# Patient Record
Sex: FEMALE | Race: WHITE | NOT HISPANIC OR LATINO | URBAN - METROPOLITAN AREA
[De-identification: names, ages, dates, MRNs, and addresses within clinical notes are randomized per-mention and may not be internally consistent; named-entity substitution may affect disease eponyms.]

---

## 2019-01-21 ENCOUNTER — INPATIENT (INPATIENT)
Facility: HOSPITAL | Age: 85
LOS: 1 days | Discharge: HOME | DRG: 308 | End: 2019-01-23
Attending: STUDENT IN AN ORGANIZED HEALTH CARE EDUCATION/TRAINING PROGRAM | Admitting: INTERNAL MEDICINE
Payer: MEDICARE

## 2019-01-21 VITALS
OXYGEN SATURATION: 97 % | HEART RATE: 35 BPM | SYSTOLIC BLOOD PRESSURE: 108 MMHG | TEMPERATURE: 98 F | DIASTOLIC BLOOD PRESSURE: 70 MMHG | WEIGHT: 119.93 LBS | RESPIRATION RATE: 18 BRPM | HEIGHT: 63 IN

## 2019-01-21 DIAGNOSIS — R00.1 BRADYCARDIA, UNSPECIFIED: ICD-10-CM

## 2019-01-21 DIAGNOSIS — Z90.12 ACQUIRED ABSENCE OF LEFT BREAST AND NIPPLE: Chronic | ICD-10-CM

## 2019-01-21 DIAGNOSIS — Z98.890 OTHER SPECIFIED POSTPROCEDURAL STATES: Chronic | ICD-10-CM

## 2019-01-21 LAB
ALBUMIN SERPL ELPH-MCNC: 3 G/DL — LOW (ref 3.3–5)
ALP SERPL-CCNC: 195 U/L — HIGH (ref 40–120)
ALT FLD-CCNC: 420 U/L DA — HIGH (ref 10–45)
ANION GAP SERPL CALC-SCNC: 13 MMOL/L — SIGNIFICANT CHANGE UP (ref 5–17)
ANION GAP SERPL CALC-SCNC: 16 MMOL/L — SIGNIFICANT CHANGE UP (ref 5–17)
APTT BLD: 29 SEC — SIGNIFICANT CHANGE UP (ref 27.5–36.3)
AST SERPL-CCNC: 610 U/L — HIGH (ref 10–40)
BILIRUB SERPL-MCNC: 0.9 MG/DL — SIGNIFICANT CHANGE UP (ref 0.2–1.2)
BUN SERPL-MCNC: 46 MG/DL — HIGH (ref 7–23)
BUN SERPL-MCNC: 47 MG/DL — HIGH (ref 7–23)
CALCIUM SERPL-MCNC: 9.1 MG/DL — SIGNIFICANT CHANGE UP (ref 8.4–10.5)
CALCIUM SERPL-MCNC: 9.5 MG/DL — SIGNIFICANT CHANGE UP (ref 8.4–10.5)
CHLORIDE SERPL-SCNC: 104 MMOL/L — SIGNIFICANT CHANGE UP (ref 96–108)
CHLORIDE SERPL-SCNC: 106 MMOL/L — SIGNIFICANT CHANGE UP (ref 96–108)
CO2 SERPL-SCNC: 20 MMOL/L — LOW (ref 22–31)
CO2 SERPL-SCNC: 21 MMOL/L — LOW (ref 22–31)
CREAT SERPL-MCNC: 1.81 MG/DL — HIGH (ref 0.5–1.3)
CREAT SERPL-MCNC: 1.87 MG/DL — HIGH (ref 0.5–1.3)
GLUCOSE SERPL-MCNC: 106 MG/DL — HIGH (ref 70–99)
GLUCOSE SERPL-MCNC: 133 MG/DL — HIGH (ref 70–99)
HCT VFR BLD CALC: 38.8 % — SIGNIFICANT CHANGE UP (ref 34.5–45)
HGB BLD-MCNC: 12.6 G/DL — SIGNIFICANT CHANGE UP (ref 11.5–15.5)
INR BLD: 1.71 RATIO — HIGH (ref 0.88–1.16)
MAGNESIUM SERPL-MCNC: 2 MG/DL — SIGNIFICANT CHANGE UP (ref 1.6–2.6)
MCHC RBC-ENTMCNC: 30.9 PG — SIGNIFICANT CHANGE UP (ref 27–34)
MCHC RBC-ENTMCNC: 32.4 GM/DL — SIGNIFICANT CHANGE UP (ref 32–36)
MCV RBC AUTO: 95.3 FL — SIGNIFICANT CHANGE UP (ref 80–100)
NT-PROBNP SERPL-SCNC: 5529 PG/ML — HIGH (ref 0–300)
PLATELET # BLD AUTO: 268 K/UL — SIGNIFICANT CHANGE UP (ref 150–400)
POTASSIUM SERPL-MCNC: 4.9 MMOL/L — SIGNIFICANT CHANGE UP (ref 3.5–5.3)
POTASSIUM SERPL-MCNC: 5.6 MMOL/L — HIGH (ref 3.5–5.3)
POTASSIUM SERPL-SCNC: 4.9 MMOL/L — SIGNIFICANT CHANGE UP (ref 3.5–5.3)
POTASSIUM SERPL-SCNC: 5.6 MMOL/L — HIGH (ref 3.5–5.3)
PROT SERPL-MCNC: 6.6 G/DL — SIGNIFICANT CHANGE UP (ref 6–8.3)
PROTHROM AB SERPL-ACNC: 19.5 SEC — HIGH (ref 10–12.9)
RBC # BLD: 4.07 M/UL — SIGNIFICANT CHANGE UP (ref 3.8–5.2)
RBC # FLD: 12.4 % — SIGNIFICANT CHANGE UP (ref 10.3–14.5)
SODIUM SERPL-SCNC: 140 MMOL/L — SIGNIFICANT CHANGE UP (ref 135–145)
SODIUM SERPL-SCNC: 140 MMOL/L — SIGNIFICANT CHANGE UP (ref 135–145)
TROPONIN I SERPL-MCNC: 0.06 NG/ML — SIGNIFICANT CHANGE UP (ref 0.02–0.06)
TSH SERPL-MCNC: 4.86 UIU/ML — HIGH (ref 0.27–4.2)
WBC # BLD: 13 K/UL — HIGH (ref 3.8–10.5)
WBC # FLD AUTO: 13 K/UL — HIGH (ref 3.8–10.5)

## 2019-01-21 PROCEDURE — 93010 ELECTROCARDIOGRAM REPORT: CPT | Mod: 76

## 2019-01-21 PROCEDURE — 99222 1ST HOSP IP/OBS MODERATE 55: CPT

## 2019-01-21 PROCEDURE — 93010 ELECTROCARDIOGRAM REPORT: CPT | Mod: 77

## 2019-01-21 PROCEDURE — 99285 EMERGENCY DEPT VISIT HI MDM: CPT

## 2019-01-21 PROCEDURE — 71045 X-RAY EXAM CHEST 1 VIEW: CPT | Mod: 26

## 2019-01-21 PROCEDURE — 99223 1ST HOSP IP/OBS HIGH 75: CPT

## 2019-01-21 RX ORDER — ATROPINE SULFATE 0.1 MG/ML
0.5 SYRINGE (ML) INJECTION ONCE
Qty: 0 | Refills: 0 | Status: COMPLETED | OUTPATIENT
Start: 2019-01-21 | End: 2019-01-21

## 2019-01-21 RX ORDER — FUROSEMIDE 40 MG
20 TABLET ORAL ONCE
Qty: 0 | Refills: 0 | Status: COMPLETED | OUTPATIENT
Start: 2019-01-21 | End: 2019-01-21

## 2019-01-21 RX ORDER — CALCIUM GLUCONATE 100 MG/ML
1 VIAL (ML) INTRAVENOUS ONCE
Qty: 0 | Refills: 0 | Status: COMPLETED | OUTPATIENT
Start: 2019-01-21 | End: 2019-01-21

## 2019-01-21 RX ORDER — GLUCAGON INJECTION, SOLUTION 0.5 MG/.1ML
1 INJECTION, SOLUTION SUBCUTANEOUS ONCE
Qty: 0 | Refills: 0 | Status: COMPLETED | OUTPATIENT
Start: 2019-01-21 | End: 2019-01-21

## 2019-01-21 RX ORDER — ONDANSETRON 8 MG/1
4 TABLET, FILM COATED ORAL ONCE
Qty: 0 | Refills: 0 | Status: COMPLETED | OUTPATIENT
Start: 2019-01-21 | End: 2019-01-21

## 2019-01-21 RX ORDER — APIXABAN 2.5 MG/1
2.5 TABLET, FILM COATED ORAL EVERY 12 HOURS
Qty: 0 | Refills: 0 | Status: DISCONTINUED | OUTPATIENT
Start: 2019-01-21 | End: 2019-01-23

## 2019-01-21 RX ORDER — PANTOPRAZOLE SODIUM 20 MG/1
40 TABLET, DELAYED RELEASE ORAL
Qty: 0 | Refills: 0 | Status: DISCONTINUED | OUTPATIENT
Start: 2019-01-21 | End: 2019-01-23

## 2019-01-21 RX ADMIN — Medication 20 MILLIGRAM(S): at 05:53

## 2019-01-21 RX ADMIN — PANTOPRAZOLE SODIUM 40 MILLIGRAM(S): 20 TABLET, DELAYED RELEASE ORAL at 05:53

## 2019-01-21 RX ADMIN — APIXABAN 2.5 MILLIGRAM(S): 2.5 TABLET, FILM COATED ORAL at 17:46

## 2019-01-21 RX ADMIN — ONDANSETRON 4 MILLIGRAM(S): 8 TABLET, FILM COATED ORAL at 02:50

## 2019-01-21 RX ADMIN — APIXABAN 2.5 MILLIGRAM(S): 2.5 TABLET, FILM COATED ORAL at 05:53

## 2019-01-21 RX ADMIN — Medication 0.5 MILLIGRAM(S): at 02:45

## 2019-01-21 RX ADMIN — Medication 100 GRAM(S): at 05:53

## 2019-01-21 RX ADMIN — GLUCAGON INJECTION, SOLUTION 1 MILLIGRAM(S): 0.5 INJECTION, SOLUTION SUBCUTANEOUS at 03:50

## 2019-01-21 NOTE — H&P ADULT - ASSESSMENT
89 y/o F with HTN, GERD, HLD, Afib, now  very bradycardic. Appears to be in CHF, too, acute diastolic.  Hyperkalemia, Acute renal insuff, elev LFTs    Admit  Telemetry monitor  Hold Amiodarone, Atenolol, Cardizem  Will give a dose of Lasix now  Calcium Gluconate  Kayexalate  FFup Labs closely  Pt refuses pacemaker  Cardio Eval  Cont Eliquis  On Pantoprazole  Bladder scan  Wants to be DNR - forms filled out  Daughter by the bedside      IMPROVE VTE Individual Risk Assessment  RISK                                                                Points  [  ] Previous VTE                                                  3  [  ] Thrombophilia                                               2  [  ] Lower limb paralysis                                      2        (unable to hold up >15 seconds)    [  ] Current Cancer                                              2         (within 6 months)  [  ] Immobilization > 24 hrs                                1  [   ICU/CCU stay > 24 hours                              1  [x  ] Age > 60                                                      1  IMPROVE VTE Score ___1___  *Pt is on Eliquis

## 2019-01-21 NOTE — ED ADULT TRIAGE NOTE - CHIEF COMPLAINT QUOTE
C/O chest pressure since 8 PM last night with nausea. Pt states" that she was discharged from [facility] 2 days ago and started taking new medications"

## 2019-01-21 NOTE — ED ADULT NURSE NOTE - PMH
Atrial fibrillation, unspecified type    GERD (gastroesophageal reflux disease)    Hypercholesterolemia    Hypertension, unspecified type    Osteoporosis    TIA (transient ischemic attack)

## 2019-01-21 NOTE — H&P ADULT - HISTORY OF PRESENT ILLNESS
pt c/o chest pain, moderate, pressure, ant chest, no radiation, tonight since 9pm while in bed. assoc c difficult breathing, felt heart beating irregularly. got very dizzy like she was going to pass out when trying to get up to walk. no fever/chills. no cough. no headache. .assoc c nausea, sweats.  was admitted to [facility] for 4 days last week after syncopal episode and newly diagnosed rapid afib. was started on eliquis and cardizem. also on atenolol. HR was in 40s on discharge 89 y/o F with HTN on Atenolol, GERD, HLD, recently hospitalized at [facility] for new afib, was in RVR,  placed on Eliquis, Amio, Cardizem, just d/cherri home 3 days ago. Was okay until last night, experienced  chest pain/ pressure, non radiating assoc with mild dyspnea, also c/o dry cough.  When she tried to get up and walk, she felt her heart beating irregularly, felt lightheaded as if she was going to pass out.   Denies fever, chills, headache, nausea, abd pain.   Pt brought to the ED, found to have HR in the 30's.  Received 0.5 mg of Atropine IV, 1 mg Glucagon.  Labs reviewed, noted to have mild hyperkalemia, new renal insuff, elev LFTs.  Cxray with CHF.

## 2019-01-21 NOTE — CONSULT NOTE ADULT - ASSESSMENT
Imp  MARKED SYMPTOMATIC BRADYCARDIA IN PATIENT WITH RECURRENT AF WHO PRESENTED WITH SOME HYPERKALEMIA RENAL AND HEPATIC INSUFFICIENCY IN THE SETTING OF A RECENTLY NORMAL ECHO AND NUCLEAR TEST.    SUGGEST  REPEAT EKG TODAY AND TOMORROW  DC ALL NEGATIVE CHRONOTROPES FOR NOW  FOLLOW POTRASSIUM RENAL FUNCTION AND LIVER FUNCTION CAREFULLY  IF FERNANDO OCCURS, MAY NEED TO CHALLENGE WITH LOWER DOSE NEGATIVE CHRONOTROPES AND COULD EVEN REQUIRE PACING

## 2019-01-21 NOTE — CONSULT NOTE ADULT - SUBJECTIVE AND OBJECTIVE BOX
Chief Complaint:  generalized weakness and sob    HPI: 89 yr old woman with recwent vadmission to [facility] with amo put on atenolol and cardizem presents with a few days of weakness and sob. dian is a relatively poor historian. records from [facility] indicate that she had a normal nulcear perfusion test and normal echo. in er she was found to have ?af with very slow ventricualr response vs high degree av block. apparently improved with atropine. Currently feels much better. ambulated to bathroom with walkewr with no difficulty    PMH:   Hypercholesterolemia  GERD (gastroesophageal reflux disease)  Osteoporosis  TIA (transient ischemic attack)  Atrial fibrillation, unspecified type  Hypertension, unspecified type    PSH:   H/O Spinal surgery  H/O left mastectomy    Family History:  FAMILY HISTORY:  No pertinent family history in first degree relatives      Social History:  Smoking:no  Alcohol:no  Drugs:no    Allergies:  No Known Allergies      Medications:  apixaban 2.5 milliGRAM(s) Oral every 12 hours  pantoprazole    Tablet 40 milliGRAM(s) Oral before breakfast      REVIEW OF SYSTEMS:  CONSTITUTIONAL: No fever, weight loss, or fatigue  EYES: No eye pain, visual disturbances, or discharge  ENMT:  No difficulty hearing, tinnitus, vertigo; No sinus or throat pain  NECK: No pain or stiffness  BREASTS: No pain, masses, or nipple discharge  RESPIRATORY: No cough, wheezing, chills or hemoptysis; No shortness of breath  CARDIOVASCULAR: see hpi  GASTROINTESTINAL: No abdominal or epigastric pain. No nausea, vomiting, or hematemesis; No diarrhea or constipation. No melena or hematochezia.  GENITOURINARY: No dysuria, frequency, hematuria, or incontinence  NEUROLOGICAL: No headaches, memory loss, loss of strength, numbness, or tremors  SKIN: No itching, burning, rashes, or lesions   LYMPH NODES: No enlarged glands  ENDOCRINE: No heat or cold intolerance; No hair loss  MUSCULOSKELETAL: No joint pain or swelling; No muscle, back, or extremity pain  PSYCHIATRIC: No depression, anxiety, mood swings, or difficulty sleeping  HEME/LYMPH: No easy bruising, or bleeding gums  ALLERY AND IMMUNOLOGIC: No hives or eczema    Physical Exam:  T(C): 36.7 (01-21-19 @ 09:18), Max: 36.7 (01-21-19 @ 09:18)  HR: 67 (01-21-19 @ 09:18) (35 - 67)  BP: 149/62 (01-21-19 @ 09:18) (108/70 - 154/44)  RR: 16 (01-21-19 @ 09:18) (16 - 18)  SpO2: 93% (01-21-19 @ 09:18) (93% - 97%)  Wt(kg): --    GENERAL: NAD, well-groomed, well-developed  HEAD:  Atraumatic, Normocephalic  EYES: EOMI, conjunctiva and sclera clear  ENT: Moist mucous membranes,  NECK: Supple, No JVD, no bruits  CHEST/LUNG: Clear to percussion bilaterally; No rales, rhonchi, wheezing, or rubs  HEART: Regular rate and rhythm; No murmurs, rubs, or gallops PMI non displaced.  ABDOMEN: Soft, Nontender, Nondistended; Bowel sounds present  EXTREMITIES:  2+ Peripheral Pulses, No clubbing, cyanosis, or edema  SKIN: No rashes or lesions  NERVOUS SYSTEM:  Alert & Oriented X3, Good concentration; Motor Strength 5/5 B/L upper and lower extremities; DTRs 2+ intact and symmetric    Cardiovascular Diagnostic Testing:  ECG: af with slow response ivcd vs high degree av block    Labs:                        12.6   13.0  )-----------( 268      ( 21 Jan 2019 02:20 )             38.8     01-21    140  |  106  |  47<H>  ----------------------------<  106<H>  4.9   |  21<L>  |  1.81<H>    Ca    9.5      21 Jan 2019 08:50  Mg     2.0     01-21    TPro  6.6  /  Alb  3.0<L>  /  TBili  0.9  /  DBili  x   /  AST  610<H>  /  ALT  420<H>  /  AlkPhos  195<H>  01-21    PT/INR - ( 21 Jan 2019 02:20 )   PT: 19.5 sec;   INR: 1.71 ratio         PTT - ( 21 Jan 2019 02:20 )  PTT:29.0 sec  CARDIAC MARKERS ( 21 Jan 2019 02:20 )  .056 ng/mL / x     / x     / x     / x          Serum Pro-Brain Natriuretic Peptide: 5529 pg/mL (01-21 @ 02:20)            Imaging:cxr -slight fluid r base

## 2019-01-21 NOTE — ED PROVIDER NOTE - MEDICAL DECISION MAKING DETAILS
chest pain/near syncope c bradycardia. recently dx c afib and started on cardizem and atenolol,  symptomatic bradycardia, r/o acs, possible medication overdose/interaction. check labs, trop. currently stable.

## 2019-01-21 NOTE — ED PROVIDER NOTE - PMH
Atrial fibrillation, unspecified type    Hypertension, unspecified type    TIA (transient ischemic attack)

## 2019-01-21 NOTE — H&P ADULT - NSHPPHYSICALEXAM_GEN_ALL_CORE
Vital Signs (24 Hrs):  T(C): 36.5 (01-21-19 @ 01:59), Max: 36.5 (01-21-19 @ 01:59)  HR: 62 (01-21-19 @ 03:25) (35 - 62)  BP: 136/61 (01-21-19 @ 03:25) (108/70 - 137/56)  RR: 18 (01-21-19 @ 03:25) (18 - 18)  SpO2: 97% (01-21-19 @ 03:25) (96% - 97%)  Daily Height in cm: 160.02 (21 Jan 2019 01:59)

## 2019-01-21 NOTE — ED PROVIDER NOTE - OBJECTIVE STATEMENT
pt c/o chest pain, moderate, pressure, ant chest, no radiation, tonight since 9pm while in bed. assoc c difficult breathing, felt heart beating irregularly. got very dizzy like she was going to pass out when trying to get up to walk. no fever/chills. no cough. no headache. .assoc c nausea, sweats.  was admitted to [facility] for 4 days last week after syncopal episode and newly diagnosed rapid afib. was started on eliquis and cardizem. also on atenolol. HR was in 40s on discharge

## 2019-01-21 NOTE — ED PROVIDER NOTE - PROGRESS NOTE DETAILS
pt's HR 29-low 30s bpm. fully awake, alert, NAD, has some nausea still, mild dizziness. no chest pain. was given atropine 0.5mg.  after about 5 minutes, HR about 50. remained about 50bpm for about 10min, now reverted to low 30s.  nausea resolved since. pt feeling better. no chest pain. no sob spoke with eICU Dr Lala regarding pt symptomatic bradycardia. he recommended pt be transferred to [facility] or [facility] for definitive treatment as pt may need pacer I spoke with pt and HCP daughter regarding her symptomatic bradycardia and possible need for transfer for pacemaker/definitive treatment.  pt and daughter expressed that she has a DNR status and that pt does not want an invasive therapies, including permanent pacemaker or temporary transvenous pacing.  she understands that if condition worsens her blood pressure may fall, she may lose consciousness, her heart may stop and that she may die.  pt demonstrates clear understanding of this.  given pt's wishes, will admit pt to tele here. pt does not want to be transferred at this point. case d/w Dr Loaiza. accepting admission I spoke with pt and HCP daughter regarding her symptomatic bradycardia and possible need for transfer for pacemaker/definitive treatment.  pt and daughter expressed that she has a DNR status and that pt does not want any invasive therapies, including permanent pacemaker or temporary transvenous pacing.  she understands that if condition worsens her blood pressure may fall, she may lose consciousness, her heart may stop and that she may die.  pt demonstrates clear understanding of this.  given pt's wishes, will admit pt to tele here. pt does not want to be transferred at this point. case d/w Dr Loaiza. accepting admission

## 2019-01-21 NOTE — ED ADULT NURSE REASSESSMENT NOTE - NS ED NURSE REASSESS COMMENT FT1
Heart rate of 29 noted on cardiac monitor. Pacing pads placed on pt. Maintained pt on monitor. Dr. Jurado made aware. Atropine 0.5 mg IVP given as ordered.

## 2019-01-21 NOTE — ED ADULT NURSE NOTE - CHIEF COMPLAINT QUOTE
C/O chest pressure since 8 PM last night with nausea. Pt states" that she was discharged from [facility] 2 days ago and started taking new medications"

## 2019-01-22 DIAGNOSIS — I48.91 UNSPECIFIED ATRIAL FIBRILLATION: ICD-10-CM

## 2019-01-22 DIAGNOSIS — K21.9 GASTRO-ESOPHAGEAL REFLUX DISEASE WITHOUT ESOPHAGITIS: ICD-10-CM

## 2019-01-22 DIAGNOSIS — I10 ESSENTIAL (PRIMARY) HYPERTENSION: ICD-10-CM

## 2019-01-22 DIAGNOSIS — R00.1 BRADYCARDIA, UNSPECIFIED: ICD-10-CM

## 2019-01-22 DIAGNOSIS — E78.00 PURE HYPERCHOLESTEROLEMIA, UNSPECIFIED: ICD-10-CM

## 2019-01-22 LAB
ALBUMIN SERPL ELPH-MCNC: 2.8 G/DL — LOW (ref 3.3–5)
ALP SERPL-CCNC: 154 U/L — HIGH (ref 40–120)
ALT FLD-CCNC: 322 U/L DA — HIGH (ref 10–45)
ANION GAP SERPL CALC-SCNC: 11 MMOL/L — SIGNIFICANT CHANGE UP (ref 5–17)
AST SERPL-CCNC: 271 U/L — HIGH (ref 10–40)
BILIRUB DIRECT SERPL-MCNC: 0.2 MG/DL — SIGNIFICANT CHANGE UP (ref 0–0.2)
BILIRUB INDIRECT FLD-MCNC: 0.4 MG/DL — SIGNIFICANT CHANGE UP (ref 0.2–1)
BILIRUB SERPL-MCNC: 0.6 MG/DL — SIGNIFICANT CHANGE UP (ref 0.2–1.2)
BUN SERPL-MCNC: 48 MG/DL — HIGH (ref 7–23)
CALCIUM SERPL-MCNC: 9.1 MG/DL — SIGNIFICANT CHANGE UP (ref 8.4–10.5)
CHLORIDE SERPL-SCNC: 106 MMOL/L — SIGNIFICANT CHANGE UP (ref 96–108)
CO2 SERPL-SCNC: 25 MMOL/L — SIGNIFICANT CHANGE UP (ref 22–31)
CREAT SERPL-MCNC: 1.63 MG/DL — HIGH (ref 0.5–1.3)
GLUCOSE SERPL-MCNC: 86 MG/DL — SIGNIFICANT CHANGE UP (ref 70–99)
POTASSIUM SERPL-MCNC: 4.3 MMOL/L — SIGNIFICANT CHANGE UP (ref 3.5–5.3)
POTASSIUM SERPL-SCNC: 4.3 MMOL/L — SIGNIFICANT CHANGE UP (ref 3.5–5.3)
PROT SERPL-MCNC: 6.1 G/DL — SIGNIFICANT CHANGE UP (ref 6–8.3)
SODIUM SERPL-SCNC: 142 MMOL/L — SIGNIFICANT CHANGE UP (ref 135–145)

## 2019-01-22 PROCEDURE — 99233 SBSQ HOSP IP/OBS HIGH 50: CPT

## 2019-01-22 PROCEDURE — 93010 ELECTROCARDIOGRAM REPORT: CPT

## 2019-01-22 RX ORDER — ATORVASTATIN CALCIUM 80 MG/1
1 TABLET, FILM COATED ORAL
Qty: 0 | Refills: 0 | COMMUNITY

## 2019-01-22 RX ORDER — CHOLECALCIFEROL (VITAMIN D3) 125 MCG
1 CAPSULE ORAL
Qty: 0 | Refills: 0 | COMMUNITY

## 2019-01-22 RX ORDER — PANTOPRAZOLE SODIUM 20 MG/1
1 TABLET, DELAYED RELEASE ORAL
Qty: 0 | Refills: 0 | COMMUNITY

## 2019-01-22 RX ADMIN — APIXABAN 2.5 MILLIGRAM(S): 2.5 TABLET, FILM COATED ORAL at 05:24

## 2019-01-22 RX ADMIN — PANTOPRAZOLE SODIUM 40 MILLIGRAM(S): 20 TABLET, DELAYED RELEASE ORAL at 05:24

## 2019-01-22 RX ADMIN — APIXABAN 2.5 MILLIGRAM(S): 2.5 TABLET, FILM COATED ORAL at 16:55

## 2019-01-22 NOTE — PROGRESS NOTE ADULT - ATTENDING COMMENTS
sick sinus syndrome  tachy carrie. patine now sinus off AV colt blockade, but clearly at risk for recurrent tachycardia. refusing pacing. will discuss with dr day [phone]

## 2019-01-22 NOTE — PROGRESS NOTE ADULT - PROBLEM SELECTOR PLAN 1
Bradycardia resulted in liver and kidney abnormal function that is improving   HR has improved with the dc of cardizem and BB  Cards rec of pacer is being considered by family and patietn

## 2019-01-22 NOTE — PROGRESS NOTE ADULT - SUBJECTIVE AND OBJECTIVE BOX
Follow up for  SUBJ:    no cardiac complaitns appears younger than stated age    PMH  Hypercholesterolemia  GERD (gastroesophageal reflux disease)  Osteoporosis  TIA (transient ischemic attack)  Atrial fibrillation, unspecified type  Hypertension, unspecified type      MEDICATIONS  (STANDING):  apixaban 2.5 milliGRAM(s) Oral every 12 hours  pantoprazole    Tablet 40 milliGRAM(s) Oral before breakfast    MEDICATIONS  (PRN):        PHYSICAL EXAM:  Vital Signs Last 24 Hrs  T(C): 36.6 (22 Jan 2019 13:14), Max: 36.6 (22 Jan 2019 13:14)  T(F): 97.9 (22 Jan 2019 13:14), Max: 97.9 (22 Jan 2019 13:14)  HR: 60 (22 Jan 2019 13:14) (54 - 66)  BP: 150/70 (22 Jan 2019 13:14) (147/55 - 156/63)  BP(mean): --  RR: 16 (22 Jan 2019 13:14) (15 - 16)  SpO2: 95% (22 Jan 2019 13:14) (95% - 97%)    GENERAL: NAD, well-groomed, well-developed  HEAD:  Atraumatic, Normocephalic  EYES: EOMI, PERRLA, conjunctiva and sclera clear  ENT: Moist mucous membranes,  NECK: Supple, No JVD, no bruits  CHEST/LUNG: Clear to percussion bilaterally; No rales, rhonchi, wheezing, or rubs  HEART: Regular rate and rhythm; No murmurs, rubs, or gallops PMI non displaced.  ABDOMEN: Soft, Nontender, Nondistended; Bowel sounds present  EXTREMITIES:  2+ Peripheral Pulses, No clubbing, cyanosis, or edema  SKIN: No rashes or lesions  NERVOUS SYSTEM:  Cranial Nerves II-XII intact      TELEMETRY:    sb    ECG:    < from: 12 Lead ECG (01.22.19 @ 04:57) >  Ventricular Rate 56 BPM    Atrial Rate 56 BPM    P-R Interval 188 ms    QRS Duration 136 ms    Q-T Interval 524 ms    QTC Calculation(Bezet) 505 ms    P Axis 36 degrees    R Axis -39 degrees    T Axis 118 degrees    Diagnosis Line Sinus bradycardiawith sinus arrhythmia  Left axis deviation  Left bundle branch block    Abnormal ECG  When compared with ECG of 21-JAN-2019 18:24,  first degree av block resolved  Confirmed by WILMAR HERNANDEZ MD ([id number]) on 1/22/2019 8:25:17 AM    < end of copied text >    ECHO:    LABS:                        12.6   13.0  )-----------( 268      ( 21 Jan 2019 02:20 )             38.8     01-22    142  |  106  |  48<H>  ----------------------------<  86  4.3   |  25  |  1.63<H>    Ca    9.1      22 Jan 2019 06:25  Mg     2.0     01-21    TPro  6.1  /  Alb  2.8<L>  /  TBili  0.6  /  DBili  0.2  /  AST  271<H>  /  ALT  322<H>  /  AlkPhos  154<H>  01-22    CARDIAC MARKERS ( 21 Jan 2019 02:20 )  .056 ng/mL / x     / x     / x     / x          PT/INR - ( 21 Jan 2019 02:20 )   PT: 19.5 sec;   INR: 1.71 ratio         PTT - ( 21 Jan 2019 02:20 )  PTT:29.0 sec    I&O's Summary    21 Jan 2019 07:01  -  22 Jan 2019 07:00  --------------------------------------------------------  IN: 400 mL / OUT: 0 mL / NET: 400 mL      BNP    RADIOLOGY & ADDITIONAL STUDIES:    ECHO:

## 2019-01-22 NOTE — PROGRESS NOTE ADULT - SUBJECTIVE AND OBJECTIVE BOX
Follow up for tachy carrie syndrome      SUBJ:    appears comfortable    PMH  Hypercholesterolemia  GERD (gastroesophageal reflux disease)  Osteoporosis  TIA (transient ischemic attack)  Atrial fibrillation, unspecified type  Hypertension, unspecified type      MEDICATIONS  (STANDING):  apixaban 2.5 milliGRAM(s) Oral every 12 hours  pantoprazole    Tablet 40 milliGRAM(s) Oral before breakfast    MEDICATIONS  (PRN):        PHYSICAL EXAM:  Vital Signs Last 24 Hrs  T(C): 36.6 (22 Jan 2019 13:14), Max: 36.6 (22 Jan 2019 13:14)  T(F): 97.9 (22 Jan 2019 13:14), Max: 97.9 (22 Jan 2019 13:14)  HR: 60 (22 Jan 2019 13:14) (54 - 61)  BP: 150/70 (22 Jan 2019 13:14) (147/55 - 156/63)  BP(mean): --  RR: 16 (22 Jan 2019 13:14) (15 - 16)  SpO2: 95% (22 Jan 2019 13:14) (95% - 97%)    GENERAL: NAD, well-groomed, well-developed appears younger than stated age  HEAD:  Atraumatic, Normocephalic  EYES: EOMI, PERRLA, conjunctiva and sclera clear  ENT: Moist mucous membranes,  NECK: Supple, No JVD, no bruits  CHEST/LUNG: Clear to percussion bilaterally; No rales, rhonchi, wheezing, or rubs  HEART: Regular rate and rhythm; No murmurs, rubs, or gallops PMI non displaced.  ABDOMEN: Soft, Nontender, Nondistended; Bowel sounds present  EXTREMITIES:  2+ Peripheral Pulses, No clubbing, cyanosis, or edema  SKIN: No rashes or lesions  NERVOUS SYSTEM:  Cranial Nerves II-XII intact      TELEMETRY:    sb    ECG:    < from: 12 Lead ECG (01.22.19 @ 04:57) >  Ventricular Rate 56 BPM    Atrial Rate 56 BPM    P-R Interval 188 ms    QRS Duration 136 ms    Q-T Interval 524 ms    QTC Calculation(Bezet) 505 ms    P Axis 36 degrees    R Axis -39 degrees    T Axis 118 degrees    Diagnosis Line Sinus bradycardiawith sinus arrhythmia  Left axis deviation  Left bundle branch block    Abnormal ECG  When compared with ECG of 21-JAN-2019 18:24,  first degree av block resolved  Confirmed by WILMAR HERNANDEZ MD ([id number]) on 1/22/2019 8:25:17 AM    < end of copied text >    ECHO:      LABS:                        12.6   13.0  )-----------( 268      ( 21 Jan 2019 02:20 )             38.8     01-22    142  |  106  |  48<H>  ----------------------------<  86  4.3   |  25  |  1.63<H>    Ca    9.1      22 Jan 2019 06:25  Mg     2.0     01-21    TPro  6.1  /  Alb  2.8<L>  /  TBili  0.6  /  DBili  0.2  /  AST  271<H>  /  ALT  322<H>  /  AlkPhos  154<H>  01-22    CARDIAC MARKERS ( 21 Jan 2019 02:20 )  .056 ng/mL / x     / x     / x     / x          PT/INR - ( 21 Jan 2019 02:20 )   PT: 19.5 sec;   INR: 1.71 ratio         PTT - ( 21 Jan 2019 02:20 )  PTT:29.0 sec    I&O's Summary    21 Jan 2019 07:01  -  22 Jan 2019 07:00  --------------------------------------------------------  IN: 400 mL / OUT: 0 mL / NET: 400 mL      BNP    RADIOLOGY & ADDITIONAL STUDIES:    ECHO:

## 2019-01-22 NOTE — PROGRESS NOTE ADULT - ATTENDING COMMENTS
tachy carrie syndrome slow Afib now rsr  discussed consideration to pacer which is a class II indication for tachy carrie syndrome. patient is currently against this option. would attempt low dose beta blocker e.g metoprolol tartrate 25 mg bid. with parameters.  outpatient halter and follow up with dr saxena. discussed with dr madsen by phone. opd follow of heart rate discussed with family. would also consider a NOAC such as Eliquis in low dose 2.5 bid  given risk factors age and elevated creat h/o TIAS and elevated CHADSVASC2 risk. patient and dr madsen in favor. call placed to son  jillian

## 2019-01-22 NOTE — PROGRESS NOTE ADULT - ASSESSMENT
89 y/o F with HTN, GERD, HLD, Afib, now  very bradycardic. Appears to be in CHF, too, acute diastolic.  Hyperkalemia, Acute renal insuff, elev LFTs    Admit  Telemetry monitor  Hold Amiodarone, Atenolol, Cardizem  Will give a dose of Lasix now  Calcium Gluconate  Kayexalate  FFup Labs closely  Pt refuses pacemaker  Cardio Eval  Cont Eliquis  On Pantoprazole  Bladder scan  Wants to be DNR - forms filled out  Daughter by the bedside      IMPROVE VTE Individual Risk Assessment  RISK                                                                Points  [  ] Previous VTE                                                  3  [  ] Thrombophilia                                               2  [  ] Lower limb paralysis                                      2        (unable to hold up >15 seconds)    [  ] Current Cancer                                              2         (within 6 months)  [  ] Immobilization > 24 hrs                                1  [   ICU/CCU stay > 24 hours                              1  [x  ] Age > 60                                                      1  IMPROVE VTE Score ___1___  *Pt is on Eliquis

## 2019-01-23 VITALS
RESPIRATION RATE: 15 BRPM | SYSTOLIC BLOOD PRESSURE: 166 MMHG | HEART RATE: 56 BPM | DIASTOLIC BLOOD PRESSURE: 54 MMHG | OXYGEN SATURATION: 94 % | TEMPERATURE: 98 F

## 2019-01-23 LAB
ALBUMIN SERPL ELPH-MCNC: 2.6 G/DL — LOW (ref 3.3–5)
ALP SERPL-CCNC: 132 U/L — HIGH (ref 40–120)
ALT FLD-CCNC: 230 U/L DA — HIGH (ref 10–45)
ANION GAP SERPL CALC-SCNC: 10 MMOL/L — SIGNIFICANT CHANGE UP (ref 5–17)
AST SERPL-CCNC: 128 U/L — HIGH (ref 10–40)
BILIRUB DIRECT SERPL-MCNC: 0.1 MG/DL — SIGNIFICANT CHANGE UP (ref 0–0.2)
BILIRUB INDIRECT FLD-MCNC: 0.4 MG/DL — SIGNIFICANT CHANGE UP (ref 0.2–1)
BILIRUB SERPL-MCNC: 0.5 MG/DL — SIGNIFICANT CHANGE UP (ref 0.2–1.2)
BUN SERPL-MCNC: 42 MG/DL — HIGH (ref 7–23)
CALCIUM SERPL-MCNC: 8.6 MG/DL — SIGNIFICANT CHANGE UP (ref 8.4–10.5)
CHLORIDE SERPL-SCNC: 107 MMOL/L — SIGNIFICANT CHANGE UP (ref 96–108)
CHOLEST SERPL-MCNC: 106 MG/DL — SIGNIFICANT CHANGE UP (ref 10–199)
CO2 SERPL-SCNC: 24 MMOL/L — SIGNIFICANT CHANGE UP (ref 22–31)
CREAT SERPL-MCNC: 1.47 MG/DL — HIGH (ref 0.5–1.3)
GLUCOSE SERPL-MCNC: 101 MG/DL — HIGH (ref 70–99)
HCT VFR BLD CALC: 34.7 % — SIGNIFICANT CHANGE UP (ref 34.5–45)
HDLC SERPL-MCNC: 34 MG/DL — LOW
HGB BLD-MCNC: 11.3 G/DL — LOW (ref 11.5–15.5)
LIPID PNL WITH DIRECT LDL SERPL: 51 MG/DL — SIGNIFICANT CHANGE UP
MCHC RBC-ENTMCNC: 30.6 PG — SIGNIFICANT CHANGE UP (ref 27–34)
MCHC RBC-ENTMCNC: 32.5 GM/DL — SIGNIFICANT CHANGE UP (ref 32–36)
MCV RBC AUTO: 94 FL — SIGNIFICANT CHANGE UP (ref 80–100)
PLATELET # BLD AUTO: 228 K/UL — SIGNIFICANT CHANGE UP (ref 150–400)
POTASSIUM SERPL-MCNC: 4.1 MMOL/L — SIGNIFICANT CHANGE UP (ref 3.5–5.3)
POTASSIUM SERPL-SCNC: 4.1 MMOL/L — SIGNIFICANT CHANGE UP (ref 3.5–5.3)
PROT SERPL-MCNC: 5.9 G/DL — LOW (ref 6–8.3)
RBC # BLD: 3.69 M/UL — LOW (ref 3.8–5.2)
RBC # FLD: 12.4 % — SIGNIFICANT CHANGE UP (ref 10.3–14.5)
SODIUM SERPL-SCNC: 141 MMOL/L — SIGNIFICANT CHANGE UP (ref 135–145)
TOTAL CHOLESTEROL/HDL RATIO MEASUREMENT: 3.1 RATIO — LOW (ref 3.3–7.1)
TRIGL SERPL-MCNC: 104 MG/DL — SIGNIFICANT CHANGE UP (ref 10–149)
WBC # BLD: 9 K/UL — SIGNIFICANT CHANGE UP (ref 3.8–10.5)
WBC # FLD AUTO: 9 K/UL — SIGNIFICANT CHANGE UP (ref 3.8–10.5)

## 2019-01-23 PROCEDURE — 99233 SBSQ HOSP IP/OBS HIGH 50: CPT

## 2019-01-23 PROCEDURE — 80061 LIPID PANEL: CPT

## 2019-01-23 PROCEDURE — 71045 X-RAY EXAM CHEST 1 VIEW: CPT

## 2019-01-23 PROCEDURE — 85027 COMPLETE CBC AUTOMATED: CPT

## 2019-01-23 PROCEDURE — 84484 ASSAY OF TROPONIN QUANT: CPT

## 2019-01-23 PROCEDURE — 93005 ELECTROCARDIOGRAM TRACING: CPT

## 2019-01-23 PROCEDURE — 99285 EMERGENCY DEPT VISIT HI MDM: CPT | Mod: 25

## 2019-01-23 PROCEDURE — 83735 ASSAY OF MAGNESIUM: CPT

## 2019-01-23 PROCEDURE — 84443 ASSAY THYROID STIM HORMONE: CPT

## 2019-01-23 PROCEDURE — 85610 PROTHROMBIN TIME: CPT

## 2019-01-23 PROCEDURE — 80048 BASIC METABOLIC PNL TOTAL CA: CPT

## 2019-01-23 PROCEDURE — 80076 HEPATIC FUNCTION PANEL: CPT

## 2019-01-23 PROCEDURE — 85730 THROMBOPLASTIN TIME PARTIAL: CPT

## 2019-01-23 PROCEDURE — 83880 ASSAY OF NATRIURETIC PEPTIDE: CPT

## 2019-01-23 PROCEDURE — 96374 THER/PROPH/DIAG INJ IV PUSH: CPT

## 2019-01-23 PROCEDURE — 99239 HOSP IP/OBS DSCHRG MGMT >30: CPT

## 2019-01-23 PROCEDURE — 96375 TX/PRO/DX INJ NEW DRUG ADDON: CPT

## 2019-01-23 PROCEDURE — 80053 COMPREHEN METABOLIC PANEL: CPT

## 2019-01-23 RX ORDER — ATENOLOL 25 MG/1
0 TABLET ORAL
Qty: 0 | Refills: 0 | COMMUNITY

## 2019-01-23 RX ORDER — DILTIAZEM HCL 120 MG
0 CAPSULE, EXT RELEASE 24 HR ORAL
Qty: 0 | Refills: 0 | COMMUNITY

## 2019-01-23 RX ORDER — APIXABAN 2.5 MG/1
1 TABLET, FILM COATED ORAL
Qty: 0 | Refills: 0 | DISCHARGE
Start: 2019-01-23

## 2019-01-23 RX ORDER — METOPROLOL TARTRATE 50 MG
1 TABLET ORAL
Qty: 30 | Refills: 0 | OUTPATIENT
Start: 2019-01-23 | End: 2019-02-06

## 2019-01-23 RX ORDER — METOPROLOL TARTRATE 50 MG
25 TABLET ORAL EVERY 12 HOURS
Qty: 0 | Refills: 0 | Status: DISCONTINUED | OUTPATIENT
Start: 2019-01-23 | End: 2019-01-23

## 2019-01-23 RX ORDER — AMIODARONE HYDROCHLORIDE 400 MG/1
1 TABLET ORAL
Qty: 0 | Refills: 0 | COMMUNITY

## 2019-01-23 RX ORDER — APIXABAN 2.5 MG/1
1 TABLET, FILM COATED ORAL
Qty: 0 | Refills: 0 | COMMUNITY

## 2019-01-23 RX ADMIN — APIXABAN 2.5 MILLIGRAM(S): 2.5 TABLET, FILM COATED ORAL at 05:09

## 2019-01-23 RX ADMIN — PANTOPRAZOLE SODIUM 40 MILLIGRAM(S): 20 TABLET, DELAYED RELEASE ORAL at 05:09

## 2019-01-23 RX ADMIN — Medication 25 MILLIGRAM(S): at 10:06

## 2019-01-23 NOTE — PROGRESS NOTE ADULT - ASSESSMENT
89 year old woman admitted with near syncope.  She has had PAF, symptomatic bradycardia.... currently in SR.  Anticoagulated    Plan  1. Continue low dose beta blocker... no significant tachycardia or bradycardia has been noted.  2. I discussed with patient and daughter re PM.... she will consider this option.  She would like to followup with her physician at [facility] before deciding to proceed.  3. Continue anticoagulation  4. Advance activity     discussed with patient and Medicine team

## 2019-01-23 NOTE — DISCHARGE NOTE ADULT - HOSPITAL COURSE
Patient came in bradycardia heart rate in the 30s and was symptomatic with severe lack of energy.  Patient responded to atropine.  Patient showed shock related kidney and liver function test afterwards.  Throughout her hospital stay these improved. Her diltiazem and atenolol was stopped and her rate stayed in the 60s and 50s.  She was seen by cardiology who recommended that she start on metoprolol 25mg bid.  She may need a pacemaker in the future. Patient came in bradycardia heart rate in the 30s and was symptomatic with severe lack of energy.  Patient responded to atropine.  Patient showed shock related kidney and liver function test afterwards.  Throughout her hospital stay these improved. Her diltiazem and atenolol was stopped and her rate stayed in the 60s and 50s.  She was seen by cardiology who recommended that she start on metoprolol 25mg bid.  She may need a pacemaker in the future.    PCP Dr. Ortez was not able to be reached by phone.  A fax letter with attending phone number was sent to her office.

## 2019-01-23 NOTE — DISCHARGE NOTE ADULT - PLAN OF CARE
Adequate rate Came with bradycardia - diltizem was stop and atenolol  Leaving with low dose BB, rate is adequate

## 2019-01-23 NOTE — DISCHARGE NOTE ADULT - MEDICATION SUMMARY - MEDICATIONS TO TAKE
I will START or STAY ON the medications listed below when I get home from the hospital:    apixaban 2.5 mg oral tablet  -- 1 tab(s) by mouth every 12 hours  -- Indication: For Anticoagulant    atorvastatin 40 mg oral tablet  -- 1 tab(s) by mouth once a day  -- Indication: For cholesterol    metoprolol tartrate 25 mg oral tablet  -- 1 tab(s) by mouth every 12 hours  -- Indication: For Blood pressure    pantoprazole 40 mg oral delayed release tablet  -- 1 tab(s) by mouth once a day  -- Indication: For GERD (gastroesophageal reflux disease)    Vitamin D3 1000 intl units oral tablet  -- 1 tab(s) by mouth once a day  -- Indication: For Vitamin

## 2019-01-23 NOTE — DIETITIAN INITIAL EVALUATION ADULT. - SOURCE
89 y/o F with HTN on Atenolol, GERD, HLD, recently hospitalized at [facility] for new afib, was in RVR,  placed on Eliquis, Amio, Cardizem, just d/cherri home 3 days ago. Was okay until last night, experienced  chest pain/ pressure, non radiating assoc with mild dyspnea, also c/o dry cough.  When she tried to get up and walk, she felt her heart beating irregularly, felt lightheaded as if she was going to pass out. ?? PPM as per patient/patient

## 2019-01-23 NOTE — PROGRESS NOTE ADULT - SUBJECTIVE AND OBJECTIVE BOX
Patient is a 89y old  Female who presents with a chief complaint of chest pressure, dizziness, near syncope (23 Jan 2019 11:24)      Patient seen and examined at bedside.  Patient denies any chest pain or shortness breath.    ALLERGIES:  No Known Allergies    MEDICATIONS:  apixaban 2.5 milliGRAM(s) Oral every 12 hours  metoprolol tartrate 25 milliGRAM(s) Oral every 12 hours  pantoprazole    Tablet 40 milliGRAM(s) Oral before breakfast    Vital Signs Last 24 Hrs  T(F): 97.6 (23 Jan 2019 07:47), Max: 98.7 (22 Jan 2019 16:40)  HR: 57 (23 Jan 2019 07:47) (57 - 60)  BP: 171/77 (23 Jan 2019 07:47) (150/70 - 173/67)  RR: 15 (23 Jan 2019 07:47) (15 - 16)  SpO2: 94% (23 Jan 2019 07:47) (94% - 96%)  I&O's Summary    22 Jan 2019 07:01  -  23 Jan 2019 07:00  --------------------------------------------------------  IN: 200 mL / OUT: 0 mL / NET: 200 mL        PHYSICAL EXAM:  General: NAD, A/O x 3  ENT: MMM  Neck: Supple, No JVD  Lungs: Clear to auscultation bilaterally  Cardio: RRR, S1/S2, No murmurs  Abdomen: Soft, Nontender, Nondistended; Bowel sounds present  Extremities: No cyanosis, No edema    LABS:                        11.3   9.0   )-----------( 228      ( 23 Jan 2019 05:50 )             34.7     01-23    141  |  107  |  42  ----------------------------<  101  4.1   |  24  |  1.47    Ca    8.6      23 Jan 2019 05:50  Mg     2.0     01-21    TPro  5.9  /  Alb  2.6  /  TBili  0.5  /  DBili  0.1  /  AST  128  /  ALT  230  /  AlkPhos  132  01-23    eGFR if Non African American: 30 mL/min/1.73M2 (01-23-19 @ 05:50)  eGFR if African American: 35 mL/min/1.73M2 (01-23-19 @ 05:50)    PT/INR - ( 21 Jan 2019 02:20 )   PT: 19.5 sec;   INR: 1.71 ratio         PTT - ( 21 Jan 2019 02:20 )  PTT:29.0 sec    CARDIAC MARKERS ( 21 Jan 2019 02:20 )  .056 ng/mL / x     / x     / x     / x          01-23 Chol 106 mg/dL LDL 51 mg/dL HDL 34 mg/dL Trig 104 mg/dL  TSH 4.86   TSH with FT4 reflex --  Total T3 --        CAPILLARY BLOOD GLUCOSE                RADIOLOGY & ADDITIONAL TESTS:    Care Discussed with Consultants/Other Providers:  Dr. Diaz

## 2019-01-23 NOTE — DISCHARGE NOTE ADULT - CARE PLAN
Principal Discharge DX:	Atrial fibrillation, unspecified type  Goal:	Adequate rate  Assessment and plan of treatment:	Came with bradycardia - diltizem was stop and atenolol  Leaving with low dose BB, rate is adequate

## 2019-01-23 NOTE — DISCHARGE NOTE ADULT - MEDICATION SUMMARY - MEDICATIONS TO STOP TAKING
I will STOP taking the medications listed below when I get home from the hospital:    amiodarone 200 mg oral tablet  -- 1 tab(s) by mouth once a day    atenolol 50 mg oral tablet  -- orally 2 times a day    dilTIAZem 120 mg oral tablet  -- orally once a day

## 2019-01-23 NOTE — DISCHARGE NOTE ADULT - PATIENT PORTAL LINK FT
You can access the [facility], offered by [facility], by registering with the following website: [email or web address]

## 2019-01-23 NOTE — PROGRESS NOTE ADULT - PROBLEM SELECTOR PLAN 1
Bradycardia resulted in liver and kidney abnormal function that is improving   HR has improved with the dc of cardizem, metoporol was restarted 1/22  Patient may need a pacer in the future

## 2019-01-23 NOTE — PROGRESS NOTE ADULT - SUBJECTIVE AND OBJECTIVE BOX
SUBJ:  Patient is a 89y old  Female who presents with a chief complaint of chest pressure, dizziness, near syncope (22 Jan 2019 18:07)  patient examined and chart reviewed   she feels good "back to normal"    PAST MEDICAL & SURGICAL HISTORY:  Hypercholesterolemia  GERD (gastroesophageal reflux disease)  Osteoporosis  TIA (transient ischemic attack)  Atrial fibrillation, unspecified type  Hypertension, unspecified type  H/O Spinal surgery: in 1976  H/O left mastectomy: in 2003      MEDICATIONS  (STANDING):  apixaban 2.5 milliGRAM(s) Oral every 12 hours  metoprolol tartrate 25 milliGRAM(s) Oral every 12 hours  pantoprazole    Tablet 40 milliGRAM(s) Oral before breakfast    MEDICATIONS  (PRN):          Vital Signs Last 24 Hrs  T(C): 36.4 (23 Jan 2019 07:47), Max: 37.1 (22 Jan 2019 16:40)  T(F): 97.6 (23 Jan 2019 07:47), Max: 98.7 (22 Jan 2019 16:40)  HR: 57 (23 Jan 2019 07:47) (57 - 60)  BP: 171/77 (23 Jan 2019 07:47) (150/70 - 173/67)  BP(mean): --  RR: 15 (23 Jan 2019 07:47) (15 - 16)  SpO2: 94% (23 Jan 2019 07:47) (94% - 96%)    REVIEW OF SYSTEMS:  CONSTITUTIONAL: No fever, weight loss, or fatigue  RESPIRATORY: No cough, wheezing, chills or hemoptysis; No shortness of breath  CARDIOVASCULAR: No chest pain or chest pressure.  No shortness of breath or dyspnea on exertion.  No palpitations, dizziness, light headedness, syncope or near syncope.  No edema, no orthopnea.   NEUROLOGICAL: No headaches, memory loss, loss of strength, numbness, or tremors      PHYSICAL EXAM  Constitutional:  WDWN. No acute distress  HEENT: normocephalic, atraumatic.  PERRLA. EOMI  Neck : No JVD. no carotid bruits  Lungs:  clear to auscultation bilaterally. no rhonchi. no wheezing  Heart:  S1 and S2. No S3, S4. II/VI systolic murmur.  Abdomen:  soft, non tender.  Extremities: No clubbing, cyanoisis or edema  Nuerologic:  A+O x 3. No focal deficits  Skin:  no rashes    LABS:                        11.3   9.0   )-----------( 228      ( 23 Jan 2019 05:50 )             34.7     01-23    141  |  107  |  42<H>  ----------------------------<  101<H>  4.1   |  24  |  1.47<H>    Ca    8.6      23 Jan 2019 05:50    TPro  5.9<L>  /  Alb  2.6<L>  /  TBili  0.5  /  DBili  0.1  /  AST  128<H>  /  ALT  230<H>  /  AlkPhos  132<H>  01-23            apixaban 2.5 milliGRAM(s) Oral every 12 hours  metoprolol tartrate 25 milliGRAM(s) Oral every 12 hours  pantoprazole    Tablet 40 milliGRAM(s) Oral before breakfast    I&O's Summary    22 Jan 2019 07:01  -  23 Jan 2019 07:00  --------------------------------------------------------  IN: 200 mL / OUT: 0 mL / NET: 200 mL    < from: 12 Lead ECG (01.22.19 @ 04:57) >   Sinus bradycardiawith sinus arrhythmia  Left axis deviation  Left bundle branch block    Abnormal ECG  When compared with ECG of 21-JAN-2019 18:24,  first degree av block resolved    < end of copied text >

## 2019-01-23 NOTE — PROGRESS NOTE ADULT - ASSESSMENT
89 y/o F with HTN, GERD, HLD, Afib, now  very bradycardic. Appears to be in CHF, too, acute diastolic.  Hyperkalemia, Acute renal insuff, elev LFTs

## 2019-01-23 NOTE — PROGRESS NOTE ADULT - REASON FOR ADMISSION
chest pressure, dizziness, near syncope

## 2019-03-11 NOTE — ED PROVIDER NOTE - MUSCULOSKELETAL, MLM
Spine appears normal, range of motion is not limited, no muscle or joint tenderness Current every day smoker

## 2022-04-25 ENCOUNTER — INPATIENT (INPATIENT)
Facility: HOSPITAL | Age: 88
LOS: 4 days | Discharge: NURSING, REHAB OR LONG-TERM CARE | DRG: 563 | End: 2022-04-30
Attending: INTERNAL MEDICINE | Admitting: HOSPITALIST
Payer: MEDICARE

## 2022-04-25 VITALS
SYSTOLIC BLOOD PRESSURE: 229 MMHG | TEMPERATURE: 97 F | RESPIRATION RATE: 16 BRPM | DIASTOLIC BLOOD PRESSURE: 131 MMHG | OXYGEN SATURATION: 98 % | HEART RATE: 87 BPM

## 2022-04-25 DIAGNOSIS — Z90.12 ACQUIRED ABSENCE OF LEFT BREAST AND NIPPLE: Chronic | ICD-10-CM

## 2022-04-25 DIAGNOSIS — Z98.890 OTHER SPECIFIED POSTPROCEDURAL STATES: Chronic | ICD-10-CM

## 2022-04-25 LAB
ALBUMIN SERPL ELPH-MCNC: 4.2 G/DL — SIGNIFICANT CHANGE UP (ref 3.3–5)
ALP SERPL-CCNC: 96 U/L — SIGNIFICANT CHANGE UP (ref 40–120)
ALT FLD-CCNC: 28 U/L — SIGNIFICANT CHANGE UP (ref 10–45)
ANION GAP SERPL CALC-SCNC: 18 MMOL/L — HIGH (ref 5–17)
APTT BLD: 26.7 SEC — LOW (ref 27.5–35.5)
AST SERPL-CCNC: 35 U/L — SIGNIFICANT CHANGE UP (ref 10–40)
BASOPHILS # BLD AUTO: 0.06 K/UL — SIGNIFICANT CHANGE UP (ref 0–0.2)
BASOPHILS NFR BLD AUTO: 0.4 % — SIGNIFICANT CHANGE UP (ref 0–2)
BILIRUB SERPL-MCNC: 1.3 MG/DL — HIGH (ref 0.2–1.2)
BUN SERPL-MCNC: 29 MG/DL — HIGH (ref 7–23)
CALCIUM SERPL-MCNC: 9.2 MG/DL — SIGNIFICANT CHANGE UP (ref 8.4–10.5)
CHLORIDE SERPL-SCNC: 103 MMOL/L — SIGNIFICANT CHANGE UP (ref 96–108)
CK SERPL-CCNC: 124 U/L — SIGNIFICANT CHANGE UP (ref 25–170)
CO2 SERPL-SCNC: 21 MMOL/L — LOW (ref 22–31)
CREAT SERPL-MCNC: 0.91 MG/DL — SIGNIFICANT CHANGE UP (ref 0.5–1.3)
EGFR: 57 ML/MIN/1.73M2 — LOW
EOSINOPHIL # BLD AUTO: 0.01 K/UL — SIGNIFICANT CHANGE UP (ref 0–0.5)
EOSINOPHIL NFR BLD AUTO: 0.1 % — SIGNIFICANT CHANGE UP (ref 0–6)
GLUCOSE SERPL-MCNC: 124 MG/DL — HIGH (ref 70–99)
HCT VFR BLD CALC: 42.6 % — SIGNIFICANT CHANGE UP (ref 34.5–45)
HGB BLD-MCNC: 13.8 G/DL — SIGNIFICANT CHANGE UP (ref 11.5–15.5)
IMM GRANULOCYTES NFR BLD AUTO: 0.5 % — SIGNIFICANT CHANGE UP (ref 0–1.5)
INR BLD: 1.33 RATIO — HIGH (ref 0.88–1.16)
LYMPHOCYTES # BLD AUTO: 1.12 K/UL — SIGNIFICANT CHANGE UP (ref 1–3.3)
LYMPHOCYTES # BLD AUTO: 7.4 % — LOW (ref 13–44)
MCHC RBC-ENTMCNC: 31.1 PG — SIGNIFICANT CHANGE UP (ref 27–34)
MCHC RBC-ENTMCNC: 32.4 GM/DL — SIGNIFICANT CHANGE UP (ref 32–36)
MCV RBC AUTO: 95.9 FL — SIGNIFICANT CHANGE UP (ref 80–100)
MONOCYTES # BLD AUTO: 0.87 K/UL — SIGNIFICANT CHANGE UP (ref 0–0.9)
MONOCYTES NFR BLD AUTO: 5.7 % — SIGNIFICANT CHANGE UP (ref 2–14)
NEUTROPHILS # BLD AUTO: 13.05 K/UL — HIGH (ref 1.8–7.4)
NEUTROPHILS NFR BLD AUTO: 85.9 % — HIGH (ref 43–77)
NRBC # BLD: 0 /100 WBCS — SIGNIFICANT CHANGE UP (ref 0–0)
PLATELET # BLD AUTO: 153 K/UL — SIGNIFICANT CHANGE UP (ref 150–400)
POTASSIUM SERPL-MCNC: 4.2 MMOL/L — SIGNIFICANT CHANGE UP (ref 3.5–5.3)
POTASSIUM SERPL-SCNC: 4.2 MMOL/L — SIGNIFICANT CHANGE UP (ref 3.5–5.3)
PROT SERPL-MCNC: 7.3 G/DL — SIGNIFICANT CHANGE UP (ref 6–8.3)
PROTHROM AB SERPL-ACNC: 15.5 SEC — HIGH (ref 10.5–13.4)
RBC # BLD: 4.44 M/UL — SIGNIFICANT CHANGE UP (ref 3.8–5.2)
RBC # FLD: 13.7 % — SIGNIFICANT CHANGE UP (ref 10.3–14.5)
SODIUM SERPL-SCNC: 142 MMOL/L — SIGNIFICANT CHANGE UP (ref 135–145)
WBC # BLD: 15.19 K/UL — HIGH (ref 3.8–10.5)
WBC # FLD AUTO: 15.19 K/UL — HIGH (ref 3.8–10.5)

## 2022-04-25 PROCEDURE — 70450 CT HEAD/BRAIN W/O DYE: CPT | Mod: 26,MG

## 2022-04-25 PROCEDURE — 73030 X-RAY EXAM OF SHOULDER: CPT | Mod: 26,RT

## 2022-04-25 PROCEDURE — G1004: CPT

## 2022-04-25 PROCEDURE — 71260 CT THORAX DX C+: CPT | Mod: 26,MG

## 2022-04-25 PROCEDURE — 72125 CT NECK SPINE W/O DYE: CPT | Mod: 26,MG

## 2022-04-25 PROCEDURE — 99285 EMERGENCY DEPT VISIT HI MDM: CPT | Mod: FS

## 2022-04-25 PROCEDURE — 74177 CT ABD & PELVIS W/CONTRAST: CPT | Mod: 26,MG

## 2022-04-25 RX ORDER — SODIUM CHLORIDE 9 MG/ML
1000 INJECTION INTRAMUSCULAR; INTRAVENOUS; SUBCUTANEOUS ONCE
Refills: 0 | Status: COMPLETED | OUTPATIENT
Start: 2022-04-25 | End: 2022-04-25

## 2022-04-25 RX ORDER — ACETAMINOPHEN 500 MG
650 TABLET ORAL ONCE
Refills: 0 | Status: COMPLETED | OUTPATIENT
Start: 2022-04-25 | End: 2022-04-25

## 2022-04-25 RX ORDER — METOPROLOL TARTRATE 50 MG
25 TABLET ORAL ONCE
Refills: 0 | Status: COMPLETED | OUTPATIENT
Start: 2022-04-25 | End: 2022-04-25

## 2022-04-25 RX ADMIN — Medication 650 MILLIGRAM(S): at 21:46

## 2022-04-25 RX ADMIN — SODIUM CHLORIDE 1000 MILLILITER(S): 9 INJECTION INTRAMUSCULAR; INTRAVENOUS; SUBCUTANEOUS at 21:44

## 2022-04-25 RX ADMIN — Medication 25 MILLIGRAM(S): at 23:00

## 2022-04-25 NOTE — ED ADULT NURSE NOTE - NSIMPLEMENTINTERV_GEN_ALL_ED
Implemented All Fall with Harm Risk Interventions:  [place] to call system. Call bell, personal items and telephone within reach. Instruct patient to call for assistance. Room bathroom lighting operational. Non-slip footwear when patient is off stretcher. Physically safe environment: no spills, clutter or unnecessary equipment. Stretcher in lowest position, wheels locked, appropriate side rails in place. Provide visual cue, wrist band, yellow gown, etc. Monitor gait and stability. Monitor for mental status changes and reorient to person, place, and time. Review medications for side effects contributing to fall risk. Reinforce activity limits and safety measures with patient and family. Provide visual clues: red socks.

## 2022-04-25 NOTE — ED PROVIDER NOTE - OBJECTIVE STATEMENT
89 year old female presents to ED c/o mechanical fall. Pt reports that she was walking through a door had a mechanical fall landing on her Right Side. She was unable to get up on her own and could not get up for 6 hours until family came to evaluate her. She reports that she is uncomfortable because she soiled herself and is also c/o RUE pain. She denies head injury or LOC. Does take Eliquis daily but is unsure why. Has otherwise been feeling well. Denies fevers, chills, HA, dizziness, chest pain, sob, abd pain, n/v

## 2022-04-25 NOTE — ED ADULT NURSE NOTE - HIV OFFER
Opt out Post-Care Instructions: I reviewed with the patient in detail post-care instructions. Patient is not to engage in any heavy lifting, exercise, or swimming for the next 14 days. Should the patient develop any fevers, chills, bleeding, severe pain patient will contact the office immediately.

## 2022-04-25 NOTE — ED PROVIDER NOTE - ATTENDING CONTRIBUTION TO CARE
pt is a 89 y/o female with fall mechanical with r arm pain, no signs of head injury but on AC unclear why with no pmhx. pt was down for maybe 6 hrs with from of legs, l arm, r arm films, pan scan due to age on ac, labs, ivf, analgesia.

## 2022-04-25 NOTE — ED PROVIDER NOTE - NS ED ATTENDING STATEMENT MOD
I have seen and examined this patient and fully participated in the care of this patient as the teaching attending.  The service was shared with the INDERJIT.  I reviewed and verified the documentation and independently performed the documented:

## 2022-04-25 NOTE — ED ADULT NURSE NOTE - OBJECTIVE STATEMENT
89 y/o female arrives to the ER by ambulance from home complaining of arm pain s/p fall. Pt is A&Ox2, speaking coherently, states having a mechanical fall this am, falling onto his R side, not able to get up own her own, reason she was down on the floor for approximately 6 hours, until family member went home to check on her. Pt denies any LOC, head trauma, positive blood thinners.  Pt complaints of R shoulder/ arm pain. At arrival pt is soiled with urine. has bilateral knee  and hip abrasions and redness  Pt denies SOB, chest pain, dizziness, N/V/D, fevers, chills. Pt denies any numbness or tingling. Peripheral pulses are strong and equal in bilateral extremities. . On assessment airway is patent, breathing spontaneously and unlabored. Skin is dry, warm and color appropriate to race.  Abdomen is soft, no distended, no tender.   Patient undressed and placed into gown, side rails up with bed locked and in lowest position for safety. call bell within reach. [place] provided. Comfort and safety provided. will continue to reassess.

## 2022-04-25 NOTE — ED PROVIDER NOTE - PROGRESS NOTE DETAILS
celio- discussed with hospitalist and the patient was admitted to medicine for further evaluation and treatment/management, updated son nick with results and pt's inability to ambulate without great amount of assistance. patient requesting admission

## 2022-04-26 DIAGNOSIS — R26.81 UNSTEADINESS ON FEET: ICD-10-CM

## 2022-04-26 LAB — SARS-COV-2 RNA SPEC QL NAA+PROBE: SIGNIFICANT CHANGE UP

## 2022-04-26 PROCEDURE — 93306 TTE W/DOPPLER COMPLETE: CPT | Mod: 26

## 2022-04-26 RX ORDER — METOPROLOL TARTRATE 50 MG
25 TABLET ORAL
Refills: 0 | Status: DISCONTINUED | OUTPATIENT
Start: 2022-04-26 | End: 2022-04-30

## 2022-04-26 RX ORDER — OXYCODONE AND ACETAMINOPHEN 5; 325 MG/1; MG/1
1 TABLET ORAL EVERY 12 HOURS
Refills: 0 | Status: DISCONTINUED | OUTPATIENT
Start: 2022-04-26 | End: 2022-04-30

## 2022-04-26 RX ORDER — SENNA PLUS 8.6 MG/1
2 TABLET ORAL AT BEDTIME
Refills: 0 | Status: DISCONTINUED | OUTPATIENT
Start: 2022-04-26 | End: 2022-04-30

## 2022-04-26 RX ORDER — LIDOCAINE 4 G/100G
1 CREAM TOPICAL ONCE
Refills: 0 | Status: COMPLETED | OUTPATIENT
Start: 2022-04-26 | End: 2022-04-26

## 2022-04-26 RX ORDER — ACETAMINOPHEN 500 MG
650 TABLET ORAL EVERY 6 HOURS
Refills: 0 | Status: DISCONTINUED | OUTPATIENT
Start: 2022-04-26 | End: 2022-04-30

## 2022-04-26 RX ORDER — HEPARIN SODIUM 5000 [USP'U]/ML
5000 INJECTION INTRAVENOUS; SUBCUTANEOUS EVERY 12 HOURS
Refills: 0 | Status: DISCONTINUED | OUTPATIENT
Start: 2022-04-26 | End: 2022-04-30

## 2022-04-26 RX ORDER — OXYCODONE AND ACETAMINOPHEN 5; 325 MG/1; MG/1
1 TABLET ORAL EVERY 12 HOURS
Refills: 0 | Status: DISCONTINUED | OUTPATIENT
Start: 2022-04-26 | End: 2022-04-26

## 2022-04-26 RX ADMIN — Medication 650 MILLIGRAM(S): at 19:46

## 2022-04-26 RX ADMIN — Medication 650 MILLIGRAM(S): at 20:16

## 2022-04-26 RX ADMIN — HEPARIN SODIUM 5000 UNIT(S): 5000 INJECTION INTRAVENOUS; SUBCUTANEOUS at 17:24

## 2022-04-26 RX ADMIN — Medication 650 MILLIGRAM(S): at 12:24

## 2022-04-26 RX ADMIN — Medication 650 MILLIGRAM(S): at 10:32

## 2022-04-26 RX ADMIN — LIDOCAINE 1 PATCH: 4 CREAM TOPICAL at 22:12

## 2022-04-26 RX ADMIN — Medication 25 MILLIGRAM(S): at 16:52

## 2022-04-26 NOTE — PHYSICAL THERAPY INITIAL EVALUATION ADULT - STRENGTHENING, PT EVAL
GOAL: Pt will improve bilateral LE strength to 3+/5 for increased limb stability, to improve gait and facilitate stair negotiation in 2 weeks.

## 2022-04-26 NOTE — H&P ADULT - HISTORY OF PRESENT ILLNESS
89 year old female      presents to ED c/o mechanical fall   . Pt reports that she was walking through a door had a mechanical fall landing on her r ight  side   She was unable to get up on her own and could not get up for 6 hours until family came    also c/o RUE pain.   She denies head injury or LOC. Does take Eliquis daily but is unsure why.   . Denies fevers, chills, HA, dizziness, chest pain, sob

## 2022-04-26 NOTE — CONSULT NOTE ADULT - SUBJECTIVE AND OBJECTIVE BOX
Patient is a 89y old  Female who presents with a chief complaint of fall (26 Apr 2022 10:09)      HPI:   89 year old female      presents to ED c/o mechanical fall   . Pt reports that she was walking through a door had a mechanical fall landing on her r ight  side   She was unable to get up on her own and could not get up for 6 hours until family came    also c/o RUE pain.   She denies head injury or LOC. Does take Eliquis daily but is unsure why.   . Denies fevers, chills, HA, dizziness, chest pain, sob (26 Apr 2022 08:17)    CT obtained demonstrated a 2.8cm lesions on right posterolateral bladder wall concerning for neoplasm for which  was consulted. Pt denies hematuria, significant smoking history or pertinent family history.      PAST MEDICAL & SURGICAL HISTORY:      REVIEW OF SYSTEMS:    CONSTITUTIONAL: No weakness, fevers or chills  HEENT: No visual changes;  No vertigo or throat pain   ENDO: No sweating, no palpitations  NECK: No pain or stiffness  MUSCULOSKELETAL: +back pain, no joint pain  RESPIRATORY: No cough, wheezing, hemoptysis; No shortness of breath  CARDIOVASCULAR: No chest pain or palpitations  GASTROINTESTINAL: No abdominal or epigastric pain. No nausea, vomiting, or hematemesis; No diarrhea or constipation. No melena or hematochezia.  NEUROLOGICAL: No numbness or weakness  PSYCH: No depression, no mood changes  SKIN: No itching, burning, rashes, or lesions   All other review of systems is negative unless indicated above.    MEDICATIONS  (STANDING):  heparin   Injectable 5000 Unit(s) SubCutaneous every 12 hours  senna 2 Tablet(s) Oral at bedtime    MEDICATIONS  (PRN):  acetaminophen     Tablet .. 650 milliGRAM(s) Oral every 6 hours PRN Moderate Pain (4 - 6)  oxycodone    5 mG/acetaminophen 325 mG 1 Tablet(s) Oral every 12 hours PRN Severe Pain (7 - 10)      Allergies    Allergy Status Unknown    Intolerances        SOCIAL HISTORY: No illicit drug use    FAMILY HISTORY:      Vital Signs Last 24 Hrs  T(C): 36.8 (26 Apr 2022 11:21), Max: 36.8 (26 Apr 2022 11:21)  T(F): 98.3 (26 Apr 2022 11:21), Max: 98.3 (26 Apr 2022 11:21)  HR: 68 (26 Apr 2022 11:21) (62 - 96)  BP: 164/62 (26 Apr 2022 11:21) (132/80 - 229/131)  BP(mean): --  RR: 18 (26 Apr 2022 11:21) (16 - 20)  SpO2: 96% (26 Apr 2022 11:21) (95% - 98%)    PHYSICAL EXAM:    Constitutional: NAD, well-developed  HEENT: LUIS, EOMI, Normal Hearing, MMM  Neck: No JVD  Back: No CVA tenderness  Respiratory: No accessory respiratory muscle use  Abd: Soft, NT/ND  Extremities: No calf tenderness  Neurological: A/O x 3, no focal deficits  Psychiatric: Normal mood, normal affect  Skin: No rashes    I&O's Summary      LABS:                        13.8   15.19 )-----------( 153      ( 25 Apr 2022 21:40 )             42.6     04-25    142  |  103  |  29<H>  ----------------------------<  124<H>  4.2   |  21<L>  |  0.91    Ca    9.2      25 Apr 2022 21:40    TPro  7.3  /  Alb  4.2  /  TBili  1.3<H>  /  DBili  x   /  AST  35  /  ALT  28  /  AlkPhos  96  04-25    PT/INR - ( 25 Apr 2022 21:40 )   PT: 15.5 sec;   INR: 1.33 ratio         PTT - ( 25 Apr 2022 21:40 )  PTT:26.7 sec    Urine Culture:     RADIOLOGY & ADDITIONAL STUDIES:
ORTHOPAEDIC SURGERY CONSULT NOTE    89yFemale RHD c/o R shoulder pain s/p mechanical fall. Patient denies head hit or LOC. Patient denies numbness or tingling. Patient denies any other injuries. Patient is on Eliquis but is unsure why. Has cane/walker but prefers to hold on to walls/furniture/people.     ROS: 10 point ROS otherwise negative    PMH:    PSH:    AH:    Meds: See med rec    T(C): 36.6 (04-26-22 @ 07:25)  HR: 67 (04-26-22 @ 07:25)  BP: 169/69 (04-26-22 @ 07:25)  RR: 16 (04-26-22 @ 07:25)  SpO2: 96% (04-26-22 @ 07:25)  Wt(kg): --    Gen: NAD  Resp: Unlabored breathing  PE RUE:  Skin intact, ecchymosis over medial upper arm   SILT axillary/med/rad/ulnar  +Motor AIN/PIN/Ulnar  +painless elbow/wrist ROM,   shoulder ROM limited 2/2 pain,   2+radial pulse, soft compartments.    Secondary:  No TTP over bony landmarks, SILT BL, ROM intact BL, distal pulses palpable.    Imaging:  XR demonstrating R proximal humerus fracture, without evidence of GH dislocation      ASSESSMENT  89yFemale with R proximal humerus fracture    - pain control  - NWB RUE in sling  - ROM elbow/wrist/fingers encouraged  - PT/OT/OOB  - No acute orthopaedic surgical intervention. Please call if you have further questions  - Can follow up with Dr. Polanco 1-2 weeks after discharge, call [phone] for appointment    Sherri Joshi PGY-2  Orthopaedic Surgery  [facility] [phone]  [facility] [id number]  [facility] [phone]     
CHIEF COMPLAINT:Patient is a 89y old  Female who presents with a chief complaint of fall (26 Apr 2022 08:17)      HPI:   89 year old female presents to ED c/o s/p  fall Pt reports that she was walking through a door had a mechanical fall landing on her r ight  side   She was unable to get up on her own and could not get up for 6 hours until family came    also c/o RUE pain.   She denies head injury or LOC. Does take Eliquis daily but is unsure why.   pt denies of any chest pain.      PAST MEDICAL & SURGICAL HISTORY:      MEDICATIONS  (STANDING):  heparin   Injectable 5000 Unit(s) SubCutaneous every 12 hours    MEDICATIONS  (PRN):  oxycodone    5 mG/acetaminophen 325 mG 1 Tablet(s) Oral every 12 hours PRN Moderate Pain (4 - 6)      FAMILY HISTORY:      SOCIAL HISTORY:    [x ] Non-smoker  [ ] Smoker  [ ] Alcohol    Allergies    Allergy Status Unknown    Intolerances    	    REVIEW OF SYSTEMS:  CONSTITUTIONAL: No fever, weight loss, or fatigue  EYES: No eye pain, visual disturbances, or discharge  ENT:  No difficulty hearing, tinnitus, vertigo; No sinus or throat pain  NECK: No pain or stiffness  RESPIRATORY: No cough, wheezing, chills or hemoptysis; No Shortness of Breath  CARDIOVASCULAR: No chest pain, palpitations, passing out, dizziness, or leg swelling  GASTROINTESTINAL: No abdominal or epigastric pain. No nausea, vomiting, or hematemesis; No diarrhea or constipation. No melena or hematochezia.  GENITOURINARY: No dysuria, frequency, hematuria, or incontinence  NEUROLOGICAL: No headaches, memory loss, loss of strength, numbness, or tremors  SKIN: No itching, burning, rashes, or lesions   LYMPH Nodes: No enlarged glands  ENDOCRINE: No heat or cold intolerance; No hair loss  MUSCULOSKELETAL: No joint pain or swelling; No muscle, back, or extremity pain  PSYCHIATRIC: No depression, anxiety, mood swings, or difficulty sleeping  HEME/LYMPH: No easy bruising, or bleeding gums  ALLERGY AND IMMUNOLOGIC: No hives or eczema	    [ ] All others negative	  [ x] Unable to obtain    PHYSICAL EXAM:  T(C): 36.6 (04-26-22 @ 07:25), Max: 36.7 (04-26-22 @ 03:58)  HR: 67 (04-26-22 @ 07:25) (62 - 96)  BP: 169/69 (04-26-22 @ 07:25) (132/80 - 229/131)  RR: 16 (04-26-22 @ 07:25) (16 - 20)  SpO2: 96% (04-26-22 @ 07:25) (95% - 98%)  Wt(kg): --  I&O's Summary      Appearance: Normal	  HEENT:   Normal oral mucosa, PERRL, EOMI	  Lymphatic: No lymphadenopathy  Cardiovascular: Normal S1 S2, No JVD, + murmurs, No edema  Respiratory: rhonchi  Psychiatry: A & O x 3, Mood & affect appropriate  Gastrointestinal:  Soft, Non-tender, + BS	  Skin: No rashes, No ecchymoses, No cyanosis	  Neurologic: Non-focal  Extremities: Normal range of motion, No clubbing, cyanosis or edema  Vascular: Peripheral pulses palpable 2+ bilaterally    TELEMETRY: 	    ECG:  	  RADIOLOGY:  OTHER: 	  	  LABS:	 	    CARDIAC MARKERS:  CARDIAC MARKERS ( 25 Apr 2022 21:40 )  x     / x     / 124 U/L / x     / x                                  13.8   15.19 )-----------( 153      ( 25 Apr 2022 21:40 )             42.6     04-25    142  |  103  |  29<H>  ----------------------------<  124<H>  4.2   |  21<L>  |  0.91    Ca    9.2      25 Apr 2022 21:40    TPro  7.3  /  Alb  4.2  /  TBili  1.3<H>  /  DBili  x   /  AST  35  /  ALT  28  /  AlkPhos  96  04-25    proBNP:   Lipid Profile:   HgA1c:   TSH:   PT/INR - ( 25 Apr 2022 21:40 )   PT: 15.5 sec;   INR: 1.33 ratio         PTT - ( 25 Apr 2022 21:40 )  PTT:26.7 sec    PREVIOUS DIAGNOSTIC TESTING:    < from: CT Chest w/ IV Cont (04.25.22 @ 22:58) >  Acute comminuted anteromedially displaced fracture of the right humeral   neck extending to the lesser tuberosity with associated lipohemarthrosis   and surrounding soft tissue edema/hematoma. Otherwise, no CT evidence of   acute traumatic injury to the chest, abdomen, or pelvis.    Small bilateral pleural effusions and mild bibasilar compressive   atelectasis.    Irregular nodular thickening along the right lower bladder wall measuring   1.6 x 2.8 cm concerning for malignancy. Recommend urine cytology and   cystoscopy on a nonemergent basis.    Endometrial thickening. Consider nonemergent pelvic sonogram.

## 2022-04-26 NOTE — ED ADULT NURSE REASSESSMENT NOTE - NS ED NURSE REASSESS COMMENT FT1
Report received from Jael GUERRERO. Introduced self to pt and family member- updated in plan of care. VS updated- see flowsheet. Pt assisted in using bed pain. Pt resting comfortably at this time.

## 2022-04-26 NOTE — PHYSICAL THERAPY INITIAL EVALUATION ADULT - PERTINENT HX OF CURRENT PROBLEM, REHAB EVAL
Pt is a 89 y/o F s/p mechanical fall when amb through a door, fell on R side. Pt on floor for 6 hours until family arrived, + c/o RUE pain. Pt +found to have R proximal humerus fx, now NWB in RUE sling. XRAY R SHOULDER (4/25):  acute impacted fx right humeral neck. (-)shoulder dislocation

## 2022-04-26 NOTE — ED ADULT NURSE REASSESSMENT NOTE - NS ED NURSE REASSESS COMMENT FT1
Pt reports minimal pain relief following administration of PRN Tylenol 650mg. Pt assisted onto bed pan.

## 2022-04-26 NOTE — OCCUPATIONAL THERAPY INITIAL EVALUATION ADULT - PERTINENT HX OF CURRENT PROBLEM, REHAB EVAL
89 y/o F s/p mechanical fall when amb through a door, fell on R side. Pt on floor for 6 hours until family arrived, + c/o RUE pain. Pt +found to have R proximal humerus fx, now NWB in RUE sling. XRAY R SHOULDER (4/25):  acute impacted fx right humeral neck. (-)shoulder dislocation

## 2022-04-26 NOTE — PHYSICAL THERAPY INITIAL EVALUATION ADULT - SITTING BALANCE: STATIC
Follow-up with the pain clinic tomorrow please call tomorrow.  Return for fever loss of bladder bowel control uncontrolled pain fevers numbness tingling weakness to the buttock or perineal area or legs or any other new or worsening problems or concerns  
fair plus

## 2022-04-26 NOTE — ED ADULT NURSE REASSESSMENT NOTE - NS ED NURSE REASSESS COMMENT FT1
MD and RN try to help patient to walk, pt has unsteady gait, no strength on legs to ambulate on her own.

## 2022-04-26 NOTE — H&P ADULT - NSHPLABSRESULTS_GEN_ALL_CORE
LABS:                        13.8   15.19 )-----------( 153      ( 25 Apr 2022 21:40 )             42.6     04-25    142  |  103  |  29<H>  ----------------------------<  124<H>  4.2   |  21<L>  |  0.91    Ca    9.2      25 Apr 2022 21:40    TPro  7.3  /  Alb  4.2  /  TBili  1.3<H>  /  DBili  x   /  AST  35  /  ALT  28  /  AlkPhos  96  04-25    PT/INR - ( 25 Apr 2022 21:40 )   PT: 15.5 sec;   INR: 1.33 ratio         PTT - ( 25 Apr 2022 21:40 )  PTT:26.7 sec  CARDIAC MARKERS ( 25 Apr 2022 21:40 )  x     / x     / 124 U/L / x     / x

## 2022-04-26 NOTE — H&P ADULT - NSHPPHYSICALEXAM_GEN_ALL_CORE
PHYSICAL EXAMINATION:  Vital Signs Last 24 Hrs  T(C): 36.6 (26 Apr 2022 07:25), Max: 36.7 (26 Apr 2022 03:58)  T(F): 97.9 (26 Apr 2022 07:25), Max: 98 (26 Apr 2022 03:58)  HR: 67 (26 Apr 2022 07:25) (62 - 96)  BP: 169/69 (26 Apr 2022 07:25) (132/80 - 229/131)  BP(mean): --  RR: 16 (26 Apr 2022 07:25) (16 - 20)  SpO2: 96% (26 Apr 2022 07:25) (95% - 98%)  CAPILLARY BLOOD GLUCOSE            GENERAL: NAD, well-groomed,  HEAD:  atraumatic, normocephalic  EYES: sclera anicteric  ENMT: mucous membranes moist  NECK: supple, No JVD  CHEST/LUNG: clear to auscultation bilaterally;    no      rales   ,   no rhonchi,   HEART: normal S1, S2  ABDOMEN: BS+, soft, ND, NT   EXTREMITIES:    no    edema    b/l LEs  NEURO: awake, ,     moves all extremities  SKIN: no     rash

## 2022-04-26 NOTE — CONSULT NOTE ADULT - ASSESSMENT
89 year old female presents to ED c/o s/p  fall Pt reports that she was walking through a door had a mechanical fall landing on her r ight  side   She was unable to get up on her own and could not get up for 6 hours until family came    also c/o RUE pain.   She denies head injury or LOC. Does take Eliquis daily but is unsure why.   pt denies of any chest pain.  pt with s/p fall ?syncope with humeral fx and pleural effusion  on cardiac exam with heart murmur, r/o AS  awaiting echo  asa daily  ortho eval  dvt prophylaxis  ?bp meds at home , increase bp sec to pain ??  tsh  b12 level

## 2022-04-26 NOTE — OCCUPATIONAL THERAPY INITIAL EVALUATION ADULT - RANGE OF MOTION EXAMINATION, UPPER EXTREMITY
RUE not formally assessed 2/2 sling, wrist/digits WFL/Left UE Active ROM was WFL (within functional limits)

## 2022-04-26 NOTE — PHYSICAL THERAPY INITIAL EVALUATION ADULT - PLANNED THERAPY INTERVENTIONS, PT EVAL
GOAL: Pt will ascend/descend 5 steps (I) with U HR and step over step pattern in 2 weeks./balance training/bed mobility training/gait training/strengthening/transfer training

## 2022-04-26 NOTE — PROVIDER CONTACT NOTE (OTHER) - SITUATION
/66, initially 191/73 upon arrival to [facility]. Lopressor 25 mg PO was given at the time. Pt c/o R arm pain w/ movement, refuses oxycodone, Tylenol given.
Electronic /74, Manual /80, asymptomatic

## 2022-04-26 NOTE — PROVIDER CONTACT NOTE (OTHER) - ACTION/TREATMENT ORDERED:
Lidocaine patch x 1 and then re-evaluate pt's BP.
OK to transfer to floor for now. No further orders at this time.

## 2022-04-26 NOTE — H&P ADULT - ASSESSMENT
89 year old female      presents to ED c/o mechanical fall   . Pt reports that she was walking through a door had a mechanical fall landing on her r ight  side   She was unable to get up on her own and could not get up for 6 hours until family came    also c/o   right arm  pain.   She denies head injury or LOC. Does take Eliquis daily but is unsure why.   . Denies fevers, chills, HA, dizziness, chest pain, sob       s/p fall   now with fx, right humerus. pl effusions,  bladder wall malignancy  ortho eval    ? chf/  card eval   echo   urology  eval. for  ? bladde rcancer /  favor  no intervention  given advanced  age  will speak with family         rad< from: CT Chest w/ IV Cont (04.25.22 @ 22:58) >  IMPRESSION:  Acute comminuted anteromedially displaced fracture of the right humeral   neck extending to the lesser tuberosity with associated lipohemarthrosis   and surrounding soft tissue edema/hematoma. Otherwise, no CT evidence of   acute traumatic injury to the chest, abdomen, or pelvis.  Small bilateral pleural effusions and mild bibasilar compressive   atelectasis.    Irregular nodular thickening along the right lower bladder wall measuring   1.6 x 2.8 cm concerning for malignancy. Recommend urine cytology and   cystoscopy on a nonemergent basis.  Endometrial thickening. Consider nonemergent pelvic sonogram.  --- End of Report --  < end of copied text >     89 year old female      presents to ED c/o mechanical fall   . Pt reports that she was walking through a door had a mechanical fall landing on her r ight  side   She was unable to get up on her own and could not get up for 6 hours until family came    also c/o   right arm  pain.   She denies head injury or LOC. Does take Eliquis daily but is unsure why.   . Denies fevers, chills, HA, dizziness, chest pain, sob       s/p fall   now with fx, right humerus. pl effusions,  bladder wall malignancy  ortho eval    ? chf/  card eval   echo   urology  eval. for  ? bladde rcancer /  favor  no intervention  given advanced  age  will speak with family/ unable  to reach   daughetr         rad< from: CT Chest w/ IV Cont (04.25.22 @ 22:58) >  IMPRESSION:  Acute comminuted anteromedially displaced fracture of the right humeral   neck extending to the lesser tuberosity with associated lipohemarthrosis   and surrounding soft tissue edema/hematoma. Otherwise, no CT evidence of   acute traumatic injury to the chest, abdomen, or pelvis.  Small bilateral pleural effusions and mild bibasilar compressive   atelectasis.    Irregular nodular thickening along the right lower bladder wall measuring   1.6 x 2.8 cm concerning for malignancy. Recommend urine cytology and   cystoscopy on a nonemergent basis.  Endometrial thickening. Consider nonemergent pelvic sonogram.  --- End of Report --  < end of copied text >

## 2022-04-26 NOTE — CONSULT NOTE ADULT - ASSESSMENT
89F with imaging findings concerning for bladder neoplasm.    -I discussed the findings at length with the patient and her son at bedside. We discussed next steps for diagnosis including CT Urogram with delayed phase to evaluate for any upper tract disease and better characterize the bladder findings as well as urine for cytology and office cystoscopy. We discussed in detail concerns regarding her age and the steps forward if, in fact, a bladder cancer was identified on workup. We discussed prognosis as well as possible alternative diagnoses. All questions answered.  -please order CT Urogram while admitted, and send urine for cytology  -please send urine for culture  -patient will "think it over" and plan to follow up in office for cystoscopy following discharge  -she understands the possible outcomes if chooses not to work up include death    Buck Viveros MD  [facility]  [address], [facility] [address], [place] [address]  Office: [phone] Fax: [phone]

## 2022-04-26 NOTE — PHYSICAL THERAPY INITIAL EVALUATION ADULT - WEIGHT-BEARING RESTRICTIONS: SIT/STAND, REHAB EVAL
BMI above normal limits, recommended weight loss, improve diet and follow up with internist. RUE in sling/nonweight-bearing

## 2022-04-26 NOTE — PHYSICAL THERAPY INITIAL EVALUATION ADULT - ACTIVE RANGE OF MOTION EXAMINATION, REHAB EVAL
R finger/wrist AROM WFL, RUE shoulder and elbow not assessed 2/2 sling/Left UE Active ROM was WFL (within functional limits)/bilateral  lower extremity Active ROM was WFL (within functional limits)

## 2022-04-26 NOTE — PHYSICAL THERAPY INITIAL EVALUATION ADULT - BALANCE TRAINING, PT EVAL
GOAL: Pt will demonstrate improved static/dynamic standing balance to good- in order to improve stability, decrease fall risk and increase independence with ADLs within 2 weeks.

## 2022-04-26 NOTE — PHYSICAL THERAPY INITIAL EVALUATION ADULT - PRECAUTIONS/LIMITATIONS, REHAB EVAL
CT HEAD (4/25):No acute intracranial hemorrhage, mass effect or acute osseous fx. Mod to severe chronic small vessel ischemic changes in the frontoparietal white matter. CT C/S (4/25): (-) fracture. C/S spondylosis. CT CHEST (4/25): Small B/L pleural effusions and mild bibasilar compressive atelectasis. Irregular nodular thickening along R lower bladder wall measuring 1.6 x 2.8 cm concerning for malignancy./fall precautions

## 2022-04-26 NOTE — CONSULT NOTE ADULT - CONSULT REASON
proximal humerus fracture
fall/ syncope/ HTN/ pleural effusion
bladder lesion incidental finding on imaging

## 2022-04-26 NOTE — PHYSICAL THERAPY INITIAL EVALUATION ADULT - ADDITIONAL COMMENTS
Pt lives alone in a PH +2 steps to enter, +additional flight to second level (able to stay on 1st floor).  Pt was amb (I) without an AD and (I) with all ADLs PTA. Pt owns multiple canes, +grab bar, and a R/W. Pt has home health aide 3 days/week for 24 hours each. Pt reports children are able to assist, if needed.

## 2022-04-26 NOTE — OCCUPATIONAL THERAPY INITIAL EVALUATION ADULT - ADDITIONAL COMMENTS
CT HEAD (4/25):No acute intracranial hemorrhage, mass effect or acute osseous fx. Mod to severe chronic small vessel ischemic changes in the frontoparietal white matter. CT C/S (4/25): (-) fracture. C/S spondylosis. CT CHEST (4/25): Small B/L pleural effusions and mild bibasilar compressive atelectasis. Irregular nodular thickening along R lower bladder wall measuring 1.6 x 2.8 cm concerning for malignancy

## 2022-04-27 PROBLEM — K21.9 GASTRO-ESOPHAGEAL REFLUX DISEASE WITHOUT ESOPHAGITIS: Chronic | Status: ACTIVE | Noted: 2019-01-21

## 2022-04-27 PROBLEM — E78.00 PURE HYPERCHOLESTEROLEMIA, UNSPECIFIED: Chronic | Status: ACTIVE | Noted: 2019-01-21

## 2022-04-27 PROBLEM — M81.0 AGE-RELATED OSTEOPOROSIS WITHOUT CURRENT PATHOLOGICAL FRACTURE: Chronic | Status: ACTIVE | Noted: 2019-01-21

## 2022-04-27 PROBLEM — I10 ESSENTIAL (PRIMARY) HYPERTENSION: Chronic | Status: ACTIVE | Noted: 2019-01-21

## 2022-04-27 PROBLEM — G45.9 TRANSIENT CEREBRAL ISCHEMIC ATTACK, UNSPECIFIED: Chronic | Status: ACTIVE | Noted: 2019-01-21

## 2022-04-27 PROBLEM — I48.91 UNSPECIFIED ATRIAL FIBRILLATION: Chronic | Status: ACTIVE | Noted: 2019-01-21

## 2022-04-27 LAB
ANION GAP SERPL CALC-SCNC: 13 MMOL/L — SIGNIFICANT CHANGE UP (ref 5–17)
BUN SERPL-MCNC: 16 MG/DL — SIGNIFICANT CHANGE UP (ref 7–23)
CALCIUM SERPL-MCNC: 8.6 MG/DL — SIGNIFICANT CHANGE UP (ref 8.4–10.5)
CHLORIDE SERPL-SCNC: 105 MMOL/L — SIGNIFICANT CHANGE UP (ref 96–108)
CO2 SERPL-SCNC: 21 MMOL/L — LOW (ref 22–31)
CREAT SERPL-MCNC: 0.73 MG/DL — SIGNIFICANT CHANGE UP (ref 0.5–1.3)
EGFR: 74 ML/MIN/1.73M2 — SIGNIFICANT CHANGE UP
GLUCOSE SERPL-MCNC: 137 MG/DL — HIGH (ref 70–99)
HCT VFR BLD CALC: 38.6 % — SIGNIFICANT CHANGE UP (ref 34.5–45)
HGB BLD-MCNC: 12.5 G/DL — SIGNIFICANT CHANGE UP (ref 11.5–15.5)
MCHC RBC-ENTMCNC: 30.4 PG — SIGNIFICANT CHANGE UP (ref 27–34)
MCHC RBC-ENTMCNC: 32.4 GM/DL — SIGNIFICANT CHANGE UP (ref 32–36)
MCV RBC AUTO: 93.9 FL — SIGNIFICANT CHANGE UP (ref 80–100)
NRBC # BLD: 0 /100 WBCS — SIGNIFICANT CHANGE UP (ref 0–0)
PLATELET # BLD AUTO: 134 K/UL — LOW (ref 150–400)
POTASSIUM SERPL-MCNC: 3.7 MMOL/L — SIGNIFICANT CHANGE UP (ref 3.5–5.3)
POTASSIUM SERPL-SCNC: 3.7 MMOL/L — SIGNIFICANT CHANGE UP (ref 3.5–5.3)
RBC # BLD: 4.11 M/UL — SIGNIFICANT CHANGE UP (ref 3.8–5.2)
RBC # FLD: 13.6 % — SIGNIFICANT CHANGE UP (ref 10.3–14.5)
SODIUM SERPL-SCNC: 139 MMOL/L — SIGNIFICANT CHANGE UP (ref 135–145)
TSH SERPL-MCNC: 6.7 UIU/ML — HIGH (ref 0.27–4.2)
VIT B12 SERPL-MCNC: 1013 PG/ML — SIGNIFICANT CHANGE UP (ref 232–1245)
WBC # BLD: 11.95 K/UL — HIGH (ref 3.8–10.5)
WBC # FLD AUTO: 11.95 K/UL — HIGH (ref 3.8–10.5)

## 2022-04-27 RX ORDER — FUROSEMIDE 40 MG
20 TABLET ORAL DAILY
Refills: 0 | Status: DISCONTINUED | OUTPATIENT
Start: 2022-04-27 | End: 2022-04-30

## 2022-04-27 RX ORDER — ATORVASTATIN CALCIUM 80 MG/1
40 TABLET, FILM COATED ORAL AT BEDTIME
Refills: 0 | Status: DISCONTINUED | OUTPATIENT
Start: 2022-04-27 | End: 2022-04-30

## 2022-04-27 RX ORDER — AMLODIPINE BESYLATE 2.5 MG/1
5 TABLET ORAL DAILY
Refills: 0 | Status: DISCONTINUED | OUTPATIENT
Start: 2022-04-27 | End: 2022-04-30

## 2022-04-27 RX ADMIN — Medication 650 MILLIGRAM(S): at 10:48

## 2022-04-27 RX ADMIN — HEPARIN SODIUM 5000 UNIT(S): 5000 INJECTION INTRAVENOUS; SUBCUTANEOUS at 05:38

## 2022-04-27 RX ADMIN — Medication 25 MILLIGRAM(S): at 17:25

## 2022-04-27 RX ADMIN — Medication 20 MILLIGRAM(S): at 17:39

## 2022-04-27 RX ADMIN — SENNA PLUS 2 TABLET(S): 8.6 TABLET ORAL at 21:00

## 2022-04-27 RX ADMIN — Medication 25 MILLIGRAM(S): at 05:37

## 2022-04-27 RX ADMIN — Medication 650 MILLIGRAM(S): at 11:38

## 2022-04-27 RX ADMIN — ATORVASTATIN CALCIUM 40 MILLIGRAM(S): 80 TABLET, FILM COATED ORAL at 21:00

## 2022-04-27 RX ADMIN — LIDOCAINE 1 PATCH: 4 CREAM TOPICAL at 10:47

## 2022-04-27 RX ADMIN — HEPARIN SODIUM 5000 UNIT(S): 5000 INJECTION INTRAVENOUS; SUBCUTANEOUS at 17:26

## 2022-04-27 RX ADMIN — AMLODIPINE BESYLATE 5 MILLIGRAM(S): 2.5 TABLET ORAL at 11:04

## 2022-04-27 NOTE — PROGRESS NOTE ADULT - SUBJECTIVE AND OBJECTIVE BOX
afebriel    REVIEW OF SYSTEMS:  GEN: no fever,    no chills  RESP: no SOB,   no cough  CVS: no chest pain,   no palpitations  GI: no abdominal pain,   no nausea,   no vomiting,   no constipation,   no diarrhea  : no dysuria,   no frequency  NEURO: no headache,   no dizziness  PSYCH: no depression,   not anxious  Derm : no rash    MEDICATIONS  (STANDING):  heparin   Injectable 5000 Unit(s) SubCutaneous every 12 hours  metoprolol tartrate 25 milliGRAM(s) Oral two times a day  senna 2 Tablet(s) Oral at bedtime    MEDICATIONS  (PRN):  acetaminophen     Tablet .. 650 milliGRAM(s) Oral every 6 hours PRN Moderate Pain (4 - 6)  oxycodone    5 mG/acetaminophen 325 mG 1 Tablet(s) Oral every 12 hours PRN Severe Pain (7 - 10)      Vital Signs Last 24 Hrs  T(C): 36.9 (27 Apr 2022 08:42), Max: 36.9 (26 Apr 2022 21:35)  T(F): 98.4 (27 Apr 2022 08:42), Max: 98.4 (26 Apr 2022 21:35)  HR: 77 (27 Apr 2022 08:42) (67 - 87)  BP: 193/91 (27 Apr 2022 08:42) (164/62 - 193/91)  BP(mean): --  RR: 18 (27 Apr 2022 08:42) (16 - 18)  SpO2: 96% (27 Apr 2022 08:42) (95% - 97%)  CAPILLARY BLOOD GLUCOSE        I&O's Summary      PHYSICAL EXAM:  HEAD:  Atraumatic, Normocephalic  NECK: Supple, No   JVD  CHEST/LUNG:   no     rales,     no,    rhonchi  HEART: Regular rate and rhythm;         murmur  ABDOMEN: Soft, Nontender, ;   EXTREMITIES:     no   edema  NEUROLOGY:  alert    LABS:                        13.8   15.19 )-----------( 153      ( 25 Apr 2022 21:40 )             42.6     04-25    142  |  103  |  29<H>  ----------------------------<  124<H>  4.2   |  21<L>  |  0.91    Ca    9.2      25 Apr 2022 21:40    TPro  7.3  /  Alb  4.2  /  TBili  1.3<H>  /  DBili  x   /  AST  35  /  ALT  28  /  AlkPhos  96  04-25    PT/INR - ( 25 Apr 2022 21:40 )   PT: 15.5 sec;   INR: 1.33 ratio         PTT - ( 25 Apr 2022 21:40 )  PTT:26.7 sec  CARDIAC MARKERS ( 25 Apr 2022 21:40 )  x     / x     / 124 U/L / x     / x                            Consultant(s) Notes Reviewed:      Care Discussed with Consultants/Other Providers:

## 2022-04-27 NOTE — PATIENT PROFILE ADULT - FALL HARM RISK - HARM RISK INTERVENTIONS
Assistance with ambulation/Assistance OOB with selected safe patient handling equipment/Communicate Risk of Fall with Harm to all staff/Discuss with provider need for PT consult/Monitor for mental status changes/Monitor gait and stability/Provide patient with walking aids - walker, cane, crutches/Reinforce activity limits and safety measures with patient and family/Reorient to person, place and time as needed/Review medications for side effects contributing to fall risk/Tailored Fall Risk Interventions/Toileting schedule using arm’s reach rule for commode and bathroom/Use of alarms - bed, chair and/or voice tab/Visual Cue: Yellow wristband and red socks/Bed in lowest position, wheels locked, appropriate side rails in place/Call bell, personal items and telephone in reach/Instruct patient to call for assistance before getting out of bed or chair/Non-slip footwear when patient is out of bed/[place] to call system/Physically safe environment - no spills, clutter or unnecessary equipment/Purposeful Proactive Rounding/Room/bathroom lighting operational, light cord in reach

## 2022-04-27 NOTE — PROGRESS NOTE ADULT - SUBJECTIVE AND OBJECTIVE BOX
CARDIOLOGY     PROGRESS  NOTE   ________________________________________________    CHIEF COMPLAINT:Patient is a 89y old  Female who presents with a chief complaint of fall (27 Apr 2022 09:24)  no complain  	  REVIEW OF SYSTEMS:  CONSTITUTIONAL: No fever, weight loss, or fatigue  EYES: No eye pain, visual disturbances, or discharge  ENT:  No difficulty hearing, tinnitus, vertigo; No sinus or throat pain  NECK: No pain or stiffness  RESPIRATORY: No cough, wheezing, chills or hemoptysis; No Shortness of Breath  CARDIOVASCULAR: No chest pain, palpitations, passing out, dizziness, or leg swelling  GASTROINTESTINAL: No abdominal or epigastric pain. No nausea, vomiting, or hematemesis; No diarrhea or constipation. No melena or hematochezia.  GENITOURINARY: No dysuria, frequency, hematuria, or incontinence  NEUROLOGICAL: No headaches, memory loss, loss of strength, numbness, or tremors  SKIN: No itching, burning, rashes, or lesions   LYMPH Nodes: No enlarged glands  ENDOCRINE: No heat or cold intolerance; No hair loss  MUSCULOSKELETAL: No joint pain or swelling; No muscle, back, or extremity pain  PSYCHIATRIC: No depression, anxiety, mood swings, or difficulty sleeping  HEME/LYMPH: No easy bruising, or bleeding gums  ALLERGY AND IMMUNOLOGIC: No hives or eczema	    [ ] All others negative	  [ ] Unable to obtain    PHYSICAL EXAM:  T(C): 36.9 (04-27-22 @ 08:42), Max: 36.9 (04-26-22 @ 21:35)  HR: 77 (04-27-22 @ 08:42) (67 - 87)  BP: 193/91 (04-27-22 @ 08:42) (164/62 - 193/91)  RR: 18 (04-27-22 @ 08:42) (16 - 18)  SpO2: 96% (04-27-22 @ 08:42) (95% - 97%)  Wt(kg): --  I&O's Summary      Appearance: Normal	  HEENT:   Normal oral mucosa, PERRL, EOMI	  Lymphatic: No lymphadenopathy  Cardiovascular: Normal S1 S2, No JVD, + murmurs, No edema  Respiratory: rhoncvhi  Psychiatry: A & O x 3, Mood & affect appropriate  Gastrointestinal:  Soft, Non-tender, + BS	  Skin: No rashes, No ecchymoses, No cyanosis	  Neurologic: Non-focal  Extremities: Normal range of motion, No clubbing, cyanosis or edema, RUE sling  Vascular: Peripheral pulses palpable 2+ bilaterally    MEDICATIONS  (STANDING):  heparin   Injectable 5000 Unit(s) SubCutaneous every 12 hours  metoprolol tartrate 25 milliGRAM(s) Oral two times a day  senna 2 Tablet(s) Oral at bedtime      TELEMETRY: 	    ECG:  	  RADIOLOGY:  OTHER: 	  	  LABS:	 	    CARDIAC MARKERS:  CARDIAC MARKERS ( 25 Apr 2022 21:40 )  x     / x     / 124 U/L / x     / x                                    13.8   15.19 )-----------( 153      ( 25 Apr 2022 21:40 )             42.6     04-25    142  |  103  |  29<H>  ----------------------------<  124<H>  4.2   |  21<L>  |  0.91    Ca    9.2      25 Apr 2022 21:40    TPro  7.3  /  Alb  4.2  /  TBili  1.3<H>  /  DBili  x   /  AST  35  /  ALT  28  /  AlkPhos  96  04-25    proBNP:   Lipid Profile:   HgA1c:   TSH:   PT/INR - ( 25 Apr 2022 21:40 )   PT: 15.5 sec;   INR: 1.33 ratio         PTT - ( 25 Apr 2022 21:40 )  PTT:26.7 sec    < from: Transthoracic Echocardiogram (04.26.22 @ 14:34) >  Mitral Valve: Mitral annular, leaflet, and chodal  calcification. Mild mitral regurgitation.  Aortic Valve/Aorta: Calcified aortic valve with normal  opening. Mild-moderate aortic regurgitation.  Normal aortic root size.  Left Atrium: Moderate left atrial enlargement.  Left Ventricle: Normal left ventricular internal dimensions  and wall thicknesses.  Normal left ventricular systolic function. No segmental  wall motion abnormalities.  Right Heart: Moderate right atrial enlargement. Normal  right ventricular size and function.  Normal appearing tricuspid valve. At least "moderate"  tricuspid regurgitation.  Normal pulmonic valve.  Pericardium/Pleura: Normal pericardium with no pericardial  effusion.  Right pleural effusion.  Hemodynamic: Estimated right atrial pressure is normal.  Mild pulmonary hyprtension. Estimated PASP 40 mmHg.  No PFO seen with color Doppler.  ------------------------------------------------------------------------  Conclusions:  Normal left ventricular systolic function. No segmental  wall motion abnormalities.  Normal appearing tricuspid valve. At least "moderate"  tricuspid regurgitation.  Mild pulmonary hyprtension.    Assessment and plan  ---------------------------   89 year old female presents to ED c/o s/p  fall Pt reports that she was walking through a door had a mechanical fall landing on her r ight  side   She was unable to get up on her own and could not get up for 6 hours until family came    also c/o RUE pain.   She denies head injury or LOC. Does take Eliquis daily but is unsure why.   pt denies of any chest pain.  pt with s/p fall ?syncope with humeral fx and pleural effusion  on cardiac exam with heart murmur, r/o AS  awaiting echo  asa daily  ortho eval  dvt prophylaxis  tsh  b12 level  rec start on bp meds  echo noted

## 2022-04-27 NOTE — PROGRESS NOTE ADULT - ASSESSMENT
89 year old female      presents to ED c/o mechanical fall   . Pt reports that she was walking through a door had a mechanical fall landing on her r ight  side   She was unable to get up on her own and could not get up for 6 hours until family came    also c/o   right arm  pain.   She denies head injury or LOC. Does take Eliquis daily but is unsure why.   . Denies fevers, chills, HA, dizziness, chest pain, sob       s/p fall   now with fx, right humerus. pl effusions,  bladder wall malignancy  ortho eval    ? chf/  card eval   echo. normal  ef   urology  eval. for  ? bladde cancer /  favor  no intervention  given advanced  age  per  son, he wants no intervention on bladder findings/ aware it may be malignancy  labs pending       rad< from: CT Chest w/ IV Cont (04.25.22 @ 22:58) >  IMPRESSION:  Acute comminuted anteromedially displaced fracture of the right humeral   neck extending to the lesser tuberosity with associated lipohemarthrosis   and surrounding soft tissue edema/hematoma. Otherwise, no CT evidence of   acute traumatic injury to the chest, abdomen, or pelvis.  Small bilateral pleural effusions and mild bibasilar compressive   atelectasis.    Irregular nodular thickening along the right lower bladder wall measuring   1.6 x 2.8 cm concerning for malignancy. Recommend urine cytology and   cystoscopy on a nonemergent basis.  Endometrial thickening. Consider nonemergent pelvic sonogram.  --- End of Report --  < end of copied text >

## 2022-04-28 RX ORDER — APIXABAN 2.5 MG/1
1 TABLET, FILM COATED ORAL
Qty: 0 | Refills: 0 | DISCHARGE

## 2022-04-28 RX ORDER — FUROSEMIDE 40 MG
1 TABLET ORAL
Qty: 0 | Refills: 0 | DISCHARGE
Start: 2022-04-28

## 2022-04-28 RX ORDER — METOPROLOL TARTRATE 50 MG
1 TABLET ORAL
Qty: 0 | Refills: 0 | DISCHARGE

## 2022-04-28 RX ORDER — AMLODIPINE BESYLATE 2.5 MG/1
1 TABLET ORAL
Qty: 0 | Refills: 0 | DISCHARGE
Start: 2022-04-28

## 2022-04-28 RX ORDER — SENNA PLUS 8.6 MG/1
2 TABLET ORAL
Qty: 0 | Refills: 0 | DISCHARGE
Start: 2022-04-28

## 2022-04-28 RX ORDER — CHLORHEXIDINE GLUCONATE 213 G/1000ML
1 SOLUTION TOPICAL DAILY
Refills: 0 | Status: DISCONTINUED | OUTPATIENT
Start: 2022-04-28 | End: 2022-04-30

## 2022-04-28 RX ORDER — FUROSEMIDE 40 MG
1 TABLET ORAL
Qty: 0 | Refills: 0 | DISCHARGE

## 2022-04-28 RX ORDER — CHOLECALCIFEROL (VITAMIN D3) 125 MCG
1 CAPSULE ORAL
Qty: 0 | Refills: 0 | DISCHARGE

## 2022-04-28 RX ORDER — ATORVASTATIN CALCIUM 80 MG/1
1 TABLET, FILM COATED ORAL
Qty: 0 | Refills: 0 | DISCHARGE

## 2022-04-28 RX ORDER — PANTOPRAZOLE SODIUM 20 MG/1
1 TABLET, DELAYED RELEASE ORAL
Qty: 0 | Refills: 0 | DISCHARGE

## 2022-04-28 RX ADMIN — ATORVASTATIN CALCIUM 40 MILLIGRAM(S): 80 TABLET, FILM COATED ORAL at 20:46

## 2022-04-28 RX ADMIN — Medication 650 MILLIGRAM(S): at 11:00

## 2022-04-28 RX ADMIN — CHLORHEXIDINE GLUCONATE 1 APPLICATION(S): 213 SOLUTION TOPICAL at 12:53

## 2022-04-28 RX ADMIN — Medication 650 MILLIGRAM(S): at 16:17

## 2022-04-28 RX ADMIN — Medication 20 MILLIGRAM(S): at 05:45

## 2022-04-28 RX ADMIN — Medication 25 MILLIGRAM(S): at 20:59

## 2022-04-28 RX ADMIN — Medication 650 MILLIGRAM(S): at 10:07

## 2022-04-28 RX ADMIN — AMLODIPINE BESYLATE 5 MILLIGRAM(S): 2.5 TABLET ORAL at 05:45

## 2022-04-28 RX ADMIN — HEPARIN SODIUM 5000 UNIT(S): 5000 INJECTION INTRAVENOUS; SUBCUTANEOUS at 05:45

## 2022-04-28 RX ADMIN — Medication 25 MILLIGRAM(S): at 05:45

## 2022-04-28 RX ADMIN — HEPARIN SODIUM 5000 UNIT(S): 5000 INJECTION INTRAVENOUS; SUBCUTANEOUS at 18:00

## 2022-04-28 NOTE — PROGRESS NOTE ADULT - SUBJECTIVE AND OBJECTIVE BOX
CARDIOLOGY     PROGRESS  NOTE   ________________________________________________    CHIEF COMPLAINT:Patient is a 89y old  Female who presents with a chief complaint of fall (28 Apr 2022 10:25)  no complain.  	  REVIEW OF SYSTEMS:  CONSTITUTIONAL: No fever, weight loss, or fatigue  EYES: No eye pain, visual disturbances, or discharge  ENT:  No difficulty hearing, tinnitus, vertigo; No sinus or throat pain  NECK: No pain or stiffness  RESPIRATORY: No cough, wheezing, chills or hemoptysis; No Shortness of Breath  CARDIOVASCULAR: No chest pain, palpitations, passing out, dizziness, or leg swelling  GASTROINTESTINAL: No abdominal or epigastric pain. No nausea, vomiting, or hematemesis; No diarrhea or constipation. No melena or hematochezia.  GENITOURINARY: No dysuria, frequency, hematuria, or incontinence  NEUROLOGICAL: No headaches, memory loss, loss of strength, numbness, or tremors  SKIN: No itching, burning, rashes, or lesions   LYMPH Nodes: No enlarged glands  ENDOCRINE: No heat or cold intolerance; No hair loss  MUSCULOSKELETAL: No joint pain or swelling; No muscle, back, or extremity pain  PSYCHIATRIC: No depression, anxiety, mood swings, or difficulty sleeping  HEME/LYMPH: No easy bruising, or bleeding gums  ALLERGY AND IMMUNOLOGIC: No hives or eczema	    [ ] All others negative	  [ ] Unable to obtain    PHYSICAL EXAM:  T(C): 37 (04-28-22 @ 10:05), Max: 37 (04-28-22 @ 10:05)  HR: 75 (04-28-22 @ 10:05) (75 - 114)  BP: 152/72 (04-28-22 @ 10:05) (139/89 - 182/88)  RR: 18 (04-28-22 @ 10:05) (18 - 18)  SpO2: 96% (04-28-22 @ 10:05) (95% - 96%)  Wt(kg): --  I&O's Summary    27 Apr 2022 07:01  -  28 Apr 2022 07:00  --------------------------------------------------------  IN: 460 mL / OUT: 789 mL / NET: -329 mL    28 Apr 2022 07:01  -  28 Apr 2022 10:41  --------------------------------------------------------  IN: 260 mL / OUT: 0 mL / NET: 260 mL        Appearance: Normal	  HEENT:   Normal oral mucosa, PERRL, EOMI	  Lymphatic: No lymphadenopathy  Cardiovascular: Normal S1 S2, No JVD, + murmurs, No edema  Respiratory: rhonchi  Psychiatry: A & O x 3, Mood & affect appropriate  Gastrointestinal:  Soft, Non-tender, + BS	  Skin: No rashes, No ecchymoses, No cyanosis	  Neurologic: Non-focal  Extremities: Normal range of motion, + r sling  Vascular: Peripheral pulses palpable 2+ bilaterally    MEDICATIONS  (STANDING):  amLODIPine   Tablet 5 milliGRAM(s) Oral daily  atorvastatin 40 milliGRAM(s) Oral at bedtime  chlorhexidine 2% Cloths 1 Application(s) Topical daily  furosemide    Tablet 20 milliGRAM(s) Oral daily  heparin   Injectable 5000 Unit(s) SubCutaneous every 12 hours  metoprolol tartrate 25 milliGRAM(s) Oral two times a day  senna 2 Tablet(s) Oral at bedtime      TELEMETRY: 	    ECG:  	  RADIOLOGY:  OTHER: 	  	  LABS:	 	    CARDIAC MARKERS:                                12.5   11.95 )-----------( 134      ( 27 Apr 2022 09:18 )             38.6     04-27    139  |  105  |  16  ----------------------------<  137<H>  3.7   |  21<L>  |  0.73    Ca    8.6      27 Apr 2022 09:18      proBNP:   Lipid Profile:   HgA1c:   TSH: Thyroid Stimulating Hormone, Serum: 6.70 uIU/mL (04-27 @ 14:46)          Assessment and plan  ---------------------------   89 year old female presents to ED c/o s/p  fall Pt reports that she was walking through a door had a mechanical fall landing on her r ight  side   She was unable to get up on her own and could not get up for 6 hours until family came    also c/o RUE pain.   She denies head injury or LOC. Does take Eliquis daily but is unsure why.   pt denies of any chest pain.  pt with s/p fall ?syncope with humeral fx and pleural effusion  on cardiac exam with heart murmur, r/o AS  awaiting echo  asa daily  ortho eval  dvt prophylaxis  tsh  b12 level  rec start on bp meds  echo noted  please check orthosttaic

## 2022-04-28 NOTE — DISCHARGE NOTE PROVIDER - NSFOLLOWUPCLINICS_GEN_ALL_ED_FT
[facility]  Gynceology/OB  [address] [place] [address]  Phone: [phone]  Fax:

## 2022-04-28 NOTE — DISCHARGE NOTE PROVIDER - NSDCMRMEDTOKEN_GEN_ALL_CORE_FT
amLODIPine 5 mg oral tablet: 1 tab(s) orally once a day  apixaban 2.5 mg oral tablet: 1 tab(s) orally every 12 hours  atorvastatin 40 mg oral tablet: 1 tab(s) orally once a day  furosemide 20 mg oral tablet: 1 tab(s) orally once a day  metoprolol tartrate 25 mg oral tablet: 1 tab(s) orally every 12 hours  pantoprazole 40 mg oral delayed release tablet: 1 tab(s) orally once a day  senna oral tablet: 2 tab(s) orally once a day (at bedtime)  Vitamin D3 1000 intl units oral tablet: 1 tab(s) orally once a day   amLODIPine 2.5 mg oral tablet: 3 tab(s) orally once a day  atorvastatin 40 mg oral tablet: 1 tab(s) orally once a day  furosemide 20 mg oral tablet: 1 tab(s) orally once a day  metoprolol tartrate 25 mg oral tablet: 1 tab(s) orally every 12 hours  pantoprazole 40 mg oral delayed release tablet: 1 tab(s) orally once a day  senna oral tablet: 2 tab(s) orally once a day (at bedtime)  Vitamin D3 1000 intl units oral tablet: 1 tab(s) orally once a day   amLODIPine 2.5 mg oral tablet: 3 tab(s) orally once a day  apixaban 2.5 mg oral tablet: 1 tab(s) orally 2 times a day  atorvastatin 40 mg oral tablet: 1 tab(s) orally once a day  furosemide 20 mg oral tablet: 1 tab(s) orally once a day  metoprolol tartrate 25 mg oral tablet: 1 tab(s) orally every 12 hours  pantoprazole 40 mg oral delayed release tablet: 1 tab(s) orally once a day  senna oral tablet: 2 tab(s) orally once a day (at bedtime)  Vitamin D3 1000 intl units oral tablet: 1 tab(s) orally once a day

## 2022-04-28 NOTE — PROGRESS NOTE ADULT - ASSESSMENT
89 year old female presents to ED c/o mechanical fall.  Pt reports that she was walking through a door had a mechanical fall landing on her right  side   She was unable to get up on her own and could not get up for 6 hours until family came. also c/o   right arm  pain.   She denies head injury or LOC. Does take Eliquis daily but is unsure why.       Plan: s/p fall, now with fx, right humerus. pl effusions,  bladder wall malignancy  ortho eval. TTE notes normal  EF.       Urology  eval. for  ? bladde cancer /  favor  no intervention  given advanced  age  per  son, he wants no intervention on bladder findings/ aware it may be malignancy   89 year old female presents to ED c/o mechanical fall.  Pt reports that she was walking through a door had a mechanical fall landing on her right  side   She was unable to get up on her own and could not get up for 6 hours until family came. also c/o   right arm  pain.   She denies head injury or LOC. Does take Eliquis daily but is unsure why.       Plan: s/p fall, now with fx, right humerus. pl effusions,  bladder wall malignancy.  Ortho notes sling and no surgery needed.     TTE notes normal  EF.       Urology  eval. for  ? bladde cancer /  favor  no intervention  given advanced  age  per  son, he wants no intervention on bladder findings/ aware it may be malignancy.     PT eval.

## 2022-04-28 NOTE — DISCHARGE NOTE PROVIDER - NSDCCPCAREPLAN_GEN_ALL_CORE_FT
PRINCIPAL DISCHARGE DIAGNOSIS  Diagnosis: Humeral fracture  Assessment and Plan of Treatment:   - continue pain control  - non weight bearing, Right upper extremity in sling  - Range of motion to elbow/wrist/fingers encouraged  - PT/OT/OOB  - No acute orthopaedic surgical intervention. Please call if you have further questions  - Can follow up with Dr. Polanco 1-2 weeks after discharge, call [phone] for appointment        SECONDARY DISCHARGE DIAGNOSES  Diagnosis: Fall  Assessment and Plan of Treatment: Transthoracic echocardiogram:  Normal left ventricular systolic function. No segmental  wall motion abnormalities.  Normal appearing tricuspid valve. At least "moderate"  tricuspid regurgitation.  Mild pulmonary hyprtension.  Please follow up with cardiology upon hospital discharge.    Diagnosis: Abnormal CT scan, bladder  Assessment and Plan of Treatment: CT findings:  Irregular nodular thickening along the right lower bladder wall measuring 1.6 x 2.8 cm concerning for malignancy. Recommend urine cytology and cystoscopy on a nonemergent basis.  Patient prefers to follow up with urology outpatient for further workup:  Buck Viveros MD  [facility]  [facility] [address] [address]  Office: [phone] Fax: [phone]      Diagnosis: Endometrial disorder  Assessment and Plan of Treatment: CT findings:   Endometrial thickening. Consider nonemergent pelvic sonogram.  Please follow up with your GYN for further workup. You may also follow up at our OB/GYN clinic.

## 2022-04-28 NOTE — DISCHARGE NOTE PROVIDER - HOSPITAL COURSE
89 year old female presents to ED c/o mechanical fall.  Pt reports that she was walking through a door had a mechanical fall landing on her right side   She was unable to get up on her own and could not get up for 6 hours until family came. also c/o right arm  pain.   She denies head injury or LOC. Does take Eliquis daily but is unsure why.     Plan: s/p fall, now with fx, right humerus. pl effusions,  bladder wall malignancy.  Ortho notes sling and no surgery needed.     TTE notes normal  EF.     Urology  eval. for  ? bladder cancer /  favor  no intervention  given advanced  age  per  son, he wants no intervention on bladder findings/ aware it may be malignancy.     PT eval: BRIANNA   89 year old female presents to ED c/o mechanical fall.  Pt reports that she was walking through a door had a mechanical fall landing on her right  side   She was unable to get up on her own and could not get up for 6 hours until family came. also c/o   right arm  pain.   She denies head injury or LOC. Does take Eliquis daily but is unsure why.     Plan: s/p fall, now with fx, right humerus. pl effusions,  bladder wall malignancy.  Ortho notes sling and no surgery needed.     TTE notes normal  EF.     Urology  eval. for  ? bladde cancer /  favor  no intervention  given advanced  age.     Per  son, he wants no intervention on bladder findings/ aware it may be malignancy.     PT eval, discharge to rehab today. Took Eliquis for past year for PAF in the past. Will resume. 89 year old female presents to ED c/o mechanical fall.  Pt reports that she was walking through a door had a mechanical fall landing on her right  side   She was unable to get up on her own and could not get up for 6 hours until family came. also c/o   right arm  pain.   She denies head injury or LOC. Does take Eliquis daily but is unsure why.     Plan: s/p fall, now with fx, right humerus. pl effusions,  bladder wall malignancy.  Ortho notes sling and no surgery needed.     TTE notes normal  EF.     Urology  eval. for  ? bladde cancer /  favor  no intervention  given advanced  age. Per  son, he wants no intervention on bladder findings/ aware it may be malignancy.     PT eval, discharge to rehab today. Took Eliquis for past year for PAF in the past. Will resume.    Pt medically stable for discharge . 89 year old female presents to ED after mechanical fall.  Pt reports that she was walking through a door had a mechanical fall landing on her right  side. She was unable to get up on her own and could not get up for 6 hours until family came home. Had mainly right arm pain on arrival. She denies head injury or LOC. Does take Eliquis daily but is unsure why.     Plan: s/p fall, now with fx, right humerus. stable small pleural effusions,  bladder wall malignancy.  Ortho recommends sling and no surgery needed.   TTE notes normal  EF, see enclosed report.  CBC and SMA-7 all normal. No infections were found. CT head no acute findings.     Urology  eval. for  ? bladder cancer, family favors no intervention  given advanced  age of 89. Per  son, he wants no intervention on bladder findings/ aware it may be malignancy.     PT eval, had good eval and was discharged to rehab at [facility]. Took Eliquis for past year for PAF in the past. Will resume.  Of note, EKG here is NSR. 89 year old female presents to ED after mechanical fall.  Pt reports that she was walking through a door had a mechanical fall landing on her right  side. She was unable to get up on her own and could not get up for 6 hours until family came home. Had mainly right arm pain on arrival. She denies head injury or LOC. Does take Eliquis daily but is unsure why.     Plan: s/p fall, now with fx, right humerus. stable small pleural effusions,  bladder wall malignancy.  Ortho recommends sling and no surgery needed.   TTE notes normal  EF, see enclosed report.  CBC and SMA-7 all normal. No infections were found. CT head no acute findings.     Urology  eval. for  ? bladder cancer, family favors no intervention  given advanced  age of 89. Per  son, he wants no intervention on bladder findings/ aware it may be malignancy.     PT eval, had good eval and was discharged to rehab at [facility]. Took Eliquis for past year for PAF in the past. Will resume.  Of note, EKG here is NSR.     Note: No clear explanation was found to explain her fall.

## 2022-04-28 NOTE — DISCHARGE NOTE PROVIDER - PROVIDER TOKENS
PROVIDER:[TOKEN:[[id number]],FOLLOWUP:[Routine],ESTABLISHEDPATIENT:[T]],PROVIDER:[TOKEN:[[id number]],FOLLOWUP:[Routine],ESTABLISHEDPATIENT:[T]]

## 2022-04-28 NOTE — PROGRESS NOTE ADULT - SUBJECTIVE AND OBJECTIVE BOX
INTERVAL HPI/OVERNIGHT EVENTS:  Pt seen and examined at bedside.     Allergies/Intolerance: Allergy Status Unknown  No Known Allergies      MEDICATIONS  (STANDING):  amLODIPine   Tablet 5 milliGRAM(s) Oral daily  atorvastatin 40 milliGRAM(s) Oral at bedtime  chlorhexidine 2% Cloths 1 Application(s) Topical daily  furosemide    Tablet 20 milliGRAM(s) Oral daily  heparin   Injectable 5000 Unit(s) SubCutaneous every 12 hours  metoprolol tartrate 25 milliGRAM(s) Oral two times a day  senna 2 Tablet(s) Oral at bedtime    MEDICATIONS  (PRN):  acetaminophen     Tablet .. 650 milliGRAM(s) Oral every 6 hours PRN Moderate Pain (4 - 6)  oxycodone    5 mG/acetaminophen 325 mG 1 Tablet(s) Oral every 12 hours PRN Severe Pain (7 - 10)        ROS: all systems reviewed and wnl      PHYSICAL EXAMINATION:  Vital Signs Last 24 Hrs  T(C): 37 (28 Apr 2022 10:05), Max: 37 (28 Apr 2022 10:05)  T(F): 98.6 (28 Apr 2022 10:05), Max: 98.6 (28 Apr 2022 10:05)  HR: 75 (28 Apr 2022 10:05) (75 - 114)  BP: 152/72 (28 Apr 2022 10:05) (139/89 - 182/88)  BP(mean): --  RR: 18 (28 Apr 2022 10:05) (18 - 18)  SpO2: 96% (28 Apr 2022 10:05) (95% - 96%)  CAPILLARY BLOOD GLUCOSE          04-27 @ 07:01 - 04-28 @ 07:00  --------------------------------------------------------  IN: 460 mL / OUT: 789 mL / NET: -329 mL    04-28 @ 07:01 - 04-28 @ 10:25  --------------------------------------------------------  IN: 260 mL / OUT: 0 mL / NET: 260 mL        GENERAL:   NECK: supple, No JVD  CHEST/LUNG: clear to auscultation bilaterally; no rales, rhonchi, or wheezing b/l  HEART: normal S1, S2  ABDOMEN: BS+, soft, ND, NT   EXTREMITIES:  pulses palpable; no clubbing, cyanosis, or edema b/l LEs  SKIN: no rashes or lesions      LABS:                        12.5   11.95 )-----------( 134      ( 27 Apr 2022 09:18 )             38.6     04-27    139  |  105  |  16  ----------------------------<  137<H>  3.7   |  21<L>  |  0.73    Ca    8.6      27 Apr 2022 09:18                 INTERVAL HPI/OVERNIGHT EVENTS:  Pt seen and examined at bedside.     Allergies/Intolerance: Allergy Status Unknown  No Known Allergies      MEDICATIONS  (STANDING):  amLODIPine   Tablet 5 milliGRAM(s) Oral daily  atorvastatin 40 milliGRAM(s) Oral at bedtime  chlorhexidine 2% Cloths 1 Application(s) Topical daily  furosemide    Tablet 20 milliGRAM(s) Oral daily  heparin   Injectable 5000 Unit(s) SubCutaneous every 12 hours  metoprolol tartrate 25 milliGRAM(s) Oral two times a day  senna 2 Tablet(s) Oral at bedtime    MEDICATIONS  (PRN):  acetaminophen     Tablet .. 650 milliGRAM(s) Oral every 6 hours PRN Moderate Pain (4 - 6)  oxycodone    5 mG/acetaminophen 325 mG 1 Tablet(s) Oral every 12 hours PRN Severe Pain (7 - 10)        ROS: all systems reviewed and wnl      PHYSICAL EXAMINATION:  Vital Signs Last 24 Hrs  T(C): 37 (28 Apr 2022 10:05), Max: 37 (28 Apr 2022 10:05)  T(F): 98.6 (28 Apr 2022 10:05), Max: 98.6 (28 Apr 2022 10:05)  HR: 75 (28 Apr 2022 10:05) (75 - 114)  BP: 152/72 (28 Apr 2022 10:05) (139/89 - 182/88)  BP(mean): --  RR: 18 (28 Apr 2022 10:05) (18 - 18)  SpO2: 96% (28 Apr 2022 10:05) (95% - 96%)  CAPILLARY BLOOD GLUCOSE          04-27 @ 07:01 - 04-28 @ 07:00  --------------------------------------------------------  IN: 460 mL / OUT: 789 mL / NET: -329 mL    04-28 @ 07:01 - 04-28 @ 10:25  --------------------------------------------------------  IN: 260 mL / OUT: 0 mL / NET: 260 mL        GENERAL: stable, in bed, comfortable  NECK: supple, No JVD  CHEST/LUNG: clear to auscultation bilaterally; no rales, rhonchi, or wheezing b/l  HEART: normal S1, S2  ABDOMEN: BS+, soft, ND, NT   EXTREMITIES:  pulses palpable; no clubbing, cyanosis, or edema b/l LEs  SKIN: no rashes or lesions      LABS:                        12.5   11.95 )-----------( 134      ( 27 Apr 2022 09:18 )             38.6     04-27    139  |  105  |  16  ----------------------------<  137<H>  3.7   |  21<L>  |  0.73    Ca    8.6      27 Apr 2022 09:18

## 2022-04-29 LAB — SARS-COV-2 RNA SPEC QL NAA+PROBE: SIGNIFICANT CHANGE UP

## 2022-04-29 RX ADMIN — OXYCODONE AND ACETAMINOPHEN 1 TABLET(S): 5; 325 TABLET ORAL at 12:30

## 2022-04-29 RX ADMIN — HEPARIN SODIUM 5000 UNIT(S): 5000 INJECTION INTRAVENOUS; SUBCUTANEOUS at 17:14

## 2022-04-29 RX ADMIN — Medication 25 MILLIGRAM(S): at 06:05

## 2022-04-29 RX ADMIN — Medication 20 MILLIGRAM(S): at 06:04

## 2022-04-29 RX ADMIN — CHLORHEXIDINE GLUCONATE 1 APPLICATION(S): 213 SOLUTION TOPICAL at 11:31

## 2022-04-29 RX ADMIN — HEPARIN SODIUM 5000 UNIT(S): 5000 INJECTION INTRAVENOUS; SUBCUTANEOUS at 06:05

## 2022-04-29 RX ADMIN — OXYCODONE AND ACETAMINOPHEN 1 TABLET(S): 5; 325 TABLET ORAL at 11:29

## 2022-04-29 RX ADMIN — Medication 25 MILLIGRAM(S): at 17:14

## 2022-04-29 RX ADMIN — AMLODIPINE BESYLATE 5 MILLIGRAM(S): 2.5 TABLET ORAL at 06:04

## 2022-04-29 NOTE — PROGRESS NOTE ADULT - ASSESSMENT
89 year old female presents to ED c/o mechanical fall.  Pt reports that she was walking through a door had a mechanical fall landing on her right  side   She was unable to get up on her own and could not get up for 6 hours until family came. also c/o   right arm  pain.   She denies head injury or LOC. Does take Eliquis daily but is unsure why.       Plan: s/p fall, now with fx, right humerus. pl effusions,  bladder wall malignancy.  Ortho notes sling and no surgery needed.     TTE notes normal  EF.       Urology  eval. for  ? bladde cancer /  favor  no intervention  given advanced  age  per  son, he wants no intervention on bladder findings/ aware it may be malignancy.     PT eval.    89 year old female presents to ED c/o mechanical fall.  Pt reports that she was walking through a door had a mechanical fall landing on her right  side   She was unable to get up on her own and could not get up for 6 hours until family came. also c/o   right arm  pain.   She denies head injury or LOC. Does take Eliquis daily but is unsure why.         Plan: s/p fall, now with fx, right humerus. pl effusions,  bladder wall malignancy.  Ortho notes sling and no surgery needed.     TTE notes normal  EF.       Urology  eval. for  ? bladde cancer /  favor  no intervention  given advanced  age.     Per  son, he wants no intervention on bladder findings/ aware it may be malignancy.     PT eval, discharge to rehab today. Took Eliquis for past year for PAF in the past. Will resume.

## 2022-04-29 NOTE — PROGRESS NOTE ADULT - SUBJECTIVE AND OBJECTIVE BOX
INTERVAL HPI/OVERNIGHT EVENTS:  Pt seen and examined at bedside.     Allergies/Intolerance: Allergy Status Unknown  No Known Allergies      MEDICATIONS  (STANDING):  amLODIPine   Tablet 5 milliGRAM(s) Oral daily  atorvastatin 40 milliGRAM(s) Oral at bedtime  chlorhexidine 2% Cloths 1 Application(s) Topical daily  furosemide    Tablet 20 milliGRAM(s) Oral daily  heparin   Injectable 5000 Unit(s) SubCutaneous every 12 hours  metoprolol tartrate 25 milliGRAM(s) Oral two times a day  senna 2 Tablet(s) Oral at bedtime    MEDICATIONS  (PRN):  acetaminophen     Tablet .. 650 milliGRAM(s) Oral every 6 hours PRN Moderate Pain (4 - 6)  oxycodone    5 mG/acetaminophen 325 mG 1 Tablet(s) Oral every 12 hours PRN Severe Pain (7 - 10)        ROS: all systems reviewed and wnl      PHYSICAL EXAMINATION:  Vital Signs Last 24 Hrs  T(C): 37.1 (29 Apr 2022 07:36), Max: 37.1 (29 Apr 2022 07:36)  T(F): 98.8 (29 Apr 2022 07:36), Max: 98.8 (29 Apr 2022 07:36)  HR: 71 (29 Apr 2022 07:36) (71 - 91)  BP: 172/86 (29 Apr 2022 07:36) (126/85 - 188/85)  BP(mean): --  RR: 18 (29 Apr 2022 07:36) (17 - 18)  SpO2: 99% (29 Apr 2022 07:36) (94% - 99%)  CAPILLARY BLOOD GLUCOSE          04-28 @ 07:01 - 04-29 @ 07:00  --------------------------------------------------------  IN: 680 mL / OUT: 759 mL / NET: -79 mL    04-29 @ 07:01 - 04-29 @ 11:06  --------------------------------------------------------  IN: 320 mL / OUT: 300 mL / NET: 20 mL        GENERAL:   NECK: supple, No JVD  CHEST/LUNG: clear to auscultation bilaterally; no rales, rhonchi, or wheezing b/l  HEART: normal S1, S2  ABDOMEN: BS+, soft, ND, NT   EXTREMITIES:  pulses palpable; no clubbing, cyanosis, or edema b/l LEs  SKIN: no rashes or lesions      LABS:                     INTERVAL HPI/OVERNIGHT EVENTS:  Pt seen and examined at bedside.     Allergies/Intolerance: Allergy Status Unknown  No Known Allergies      MEDICATIONS  (STANDING):  amLODIPine   Tablet 5 milliGRAM(s) Oral daily  atorvastatin 40 milliGRAM(s) Oral at bedtime  chlorhexidine 2% Cloths 1 Application(s) Topical daily  furosemide    Tablet 20 milliGRAM(s) Oral daily  heparin   Injectable 5000 Unit(s) SubCutaneous every 12 hours  metoprolol tartrate 25 milliGRAM(s) Oral two times a day  senna 2 Tablet(s) Oral at bedtime    MEDICATIONS  (PRN):  acetaminophen     Tablet .. 650 milliGRAM(s) Oral every 6 hours PRN Moderate Pain (4 - 6)  oxycodone    5 mG/acetaminophen 325 mG 1 Tablet(s) Oral every 12 hours PRN Severe Pain (7 - 10)        ROS: all systems reviewed and wnl      PHYSICAL EXAMINATION:  Vital Signs Last 24 Hrs  T(C): 37.1 (29 Apr 2022 07:36), Max: 37.1 (29 Apr 2022 07:36)  T(F): 98.8 (29 Apr 2022 07:36), Max: 98.8 (29 Apr 2022 07:36)  HR: 71 (29 Apr 2022 07:36) (71 - 91)  BP: 172/86 (29 Apr 2022 07:36) (126/85 - 188/85)  BP(mean): --  RR: 18 (29 Apr 2022 07:36) (17 - 18)  SpO2: 99% (29 Apr 2022 07:36) (94% - 99%)  CAPILLARY BLOOD GLUCOSE          04-28 @ 07:01 - 04-29 @ 07:00  --------------------------------------------------------  IN: 680 mL / OUT: 759 mL / NET: -79 mL    04-29 @ 07:01 - 04-29 @ 11:06  --------------------------------------------------------  IN: 320 mL / OUT: 300 mL / NET: 20 mL        GENERAL: in bed, stable, right arm sling in place.   NECK: supple, No JVD  CHEST/LUNG: clear to auscultation bilaterally; no rales, rhonchi, or wheezing b/l  HEART: normal S1, S2  ABDOMEN: BS+, soft, ND, NT   EXTREMITIES:  pulses palpable; no clubbing, cyanosis, or edema b/l LEs  SKIN: no rashes or lesions      LABS:

## 2022-04-29 NOTE — DISCHARGE NOTE NURSING/CASE MANAGEMENT/SOCIAL WORK - PATIENT PORTAL LINK FT
You can access the FollowMyHealth Patient Portal offered by [facility] by registering at the following website: [email or web address]. By joining [facility]’s FollowMyHealth portal, you will also be able to view your health information using other applications (apps) compatible with our system.

## 2022-04-29 NOTE — PROGRESS NOTE ADULT - SUBJECTIVE AND OBJECTIVE BOX
CARDIOLOGY     PROGRESS  NOTE   ________________________________________________    CHIEF COMPLAINT:Patient is a 89y old  Female who presents with a chief complaint of fall (29 Apr 2022 11:05)  no complain.  	  REVIEW OF SYSTEMS:  CONSTITUTIONAL: No fever, weight loss, or fatigue  EYES: No eye pain, visual disturbances, or discharge  ENT:  No difficulty hearing, tinnitus, vertigo; No sinus or throat pain  NECK: No pain or stiffness  RESPIRATORY: No cough, wheezing, chills or hemoptysis; No Shortness of Breath  CARDIOVASCULAR: No chest pain, palpitations, passing out, dizziness, or leg swelling  GASTROINTESTINAL: No abdominal or epigastric pain. No nausea, vomiting, or hematemesis; No diarrhea or constipation. No melena or hematochezia.  GENITOURINARY: No dysuria, frequency, hematuria, or incontinence  NEUROLOGICAL: No headaches, memory loss, loss of strength, numbness, or tremors  SKIN: No itching, burning, rashes, or lesions   LYMPH Nodes: No enlarged glands  ENDOCRINE: No heat or cold intolerance; No hair loss  MUSCULOSKELETAL: No joint pain or swelling; No muscle, back, or extremity pain  PSYCHIATRIC: No depression, anxiety, mood swings, or difficulty sleeping  HEME/LYMPH: No easy bruising, or bleeding gums  ALLERGY AND IMMUNOLOGIC: No hives or eczema	    [ ] All others negative	  [ ] Unable to obtain    PHYSICAL EXAM:  T(C): 36.6 (04-29-22 @ 16:43), Max: 37.1 (04-29-22 @ 07:36)  HR: 85 (04-29-22 @ 16:43) (71 - 91)  BP: 159/73 (04-29-22 @ 16:43) (126/85 - 188/85)  RR: 18 (04-29-22 @ 16:43) (18 - 18)  SpO2: 98% (04-29-22 @ 16:43) (94% - 99%)  Wt(kg): --  I&O's Summary    28 Apr 2022 07:01  -  29 Apr 2022 07:00  --------------------------------------------------------  IN: 680 mL / OUT: 759 mL / NET: -79 mL    29 Apr 2022 07:01  -  29 Apr 2022 19:23  --------------------------------------------------------  IN: 540 mL / OUT: 300 mL / NET: 240 mL        Appearance: Normal	  HEENT:   Normal oral mucosa, PERRL, EOMI	  Lymphatic: No lymphadenopathy  Cardiovascular: Normal S1 S2, No JVD, + murmurs, No edema  Respiratory: Lungs clear to auscultation	  Psychiatry: A & O x 3, Mood & affect appropriate  Gastrointestinal:  Soft, Non-tender, + BS	  Skin: No rashes, No ecchymoses, No cyanosis	  Neurologic: Non-focal  Extremities: Normal range of motion, No clubbing, cyanosis or edema  Vascular: Peripheral pulses palpable 2+ bilaterally    MEDICATIONS  (STANDING):  amLODIPine   Tablet 5 milliGRAM(s) Oral daily  atorvastatin 40 milliGRAM(s) Oral at bedtime  chlorhexidine 2% Cloths 1 Application(s) Topical daily  furosemide    Tablet 20 milliGRAM(s) Oral daily  heparin   Injectable 5000 Unit(s) SubCutaneous every 12 hours  metoprolol tartrate 25 milliGRAM(s) Oral two times a day  senna 2 Tablet(s) Oral at bedtime      TELEMETRY: 	    ECG:  	  RADIOLOGY:  OTHER: 	  	  LABS:	 	    CARDIAC MARKERS:                  proBNP:   Lipid Profile:   HgA1c:   TSH: Thyroid Stimulating Hormone, Serum: 6.70 uIU/mL (04-27 @ 14:46)          Assessment and plan  ---------------------------   89 year old female presents to ED c/o s/p  fall Pt reports that she was walking through a door had a mechanical fall landing on her r ight  side   She was unable to get up on her own and could not get up for 6 hours until family came    also c/o RUE pain.   She denies head injury or LOC. Does take Eliquis daily but is unsure why.   pt denies of any chest pain.  pt with s/p fall ?syncope with humeral fx and pleural effusion  on cardiac exam with heart murmur, r/o AS  awaiting echo  asa daily  ortho eval  dvt prophylaxis  tsh  b12 level  rec start on bp meds  echo noted  please check orthosttaic, awaiting

## 2022-04-29 NOTE — DISCHARGE NOTE NURSING/CASE MANAGEMENT/SOCIAL WORK - NSDCPEFALRISK_GEN_ALL_CORE
For information on Fall & Injury Prevention, visit: [email or web address] OR  [email or web address] OR  https://www.cdc.gov/steadi/patient.html

## 2022-04-30 VITALS
SYSTOLIC BLOOD PRESSURE: 140 MMHG | HEART RATE: 74 BPM | TEMPERATURE: 98 F | DIASTOLIC BLOOD PRESSURE: 66 MMHG | OXYGEN SATURATION: 97 % | RESPIRATION RATE: 18 BRPM

## 2022-04-30 PROCEDURE — G1004: CPT

## 2022-04-30 PROCEDURE — 99285 EMERGENCY DEPT VISIT HI MDM: CPT

## 2022-04-30 PROCEDURE — 84443 ASSAY THYROID STIM HORMONE: CPT

## 2022-04-30 PROCEDURE — 70450 CT HEAD/BRAIN W/O DYE: CPT | Mod: MG

## 2022-04-30 PROCEDURE — 82550 ASSAY OF CK (CPK): CPT

## 2022-04-30 PROCEDURE — 80053 COMPREHEN METABOLIC PANEL: CPT

## 2022-04-30 PROCEDURE — 74177 CT ABD & PELVIS W/CONTRAST: CPT | Mod: MG

## 2022-04-30 PROCEDURE — 36415 COLL VENOUS BLD VENIPUNCTURE: CPT

## 2022-04-30 PROCEDURE — 71260 CT THORAX DX C+: CPT | Mod: MG

## 2022-04-30 PROCEDURE — 97161 PT EVAL LOW COMPLEX 20 MIN: CPT

## 2022-04-30 PROCEDURE — U0005: CPT

## 2022-04-30 PROCEDURE — 97165 OT EVAL LOW COMPLEX 30 MIN: CPT

## 2022-04-30 PROCEDURE — 80048 BASIC METABOLIC PNL TOTAL CA: CPT

## 2022-04-30 PROCEDURE — 85025 COMPLETE CBC W/AUTO DIFF WBC: CPT

## 2022-04-30 PROCEDURE — 85610 PROTHROMBIN TIME: CPT

## 2022-04-30 PROCEDURE — 85730 THROMBOPLASTIN TIME PARTIAL: CPT

## 2022-04-30 PROCEDURE — U0003: CPT

## 2022-04-30 PROCEDURE — 93306 TTE W/DOPPLER COMPLETE: CPT

## 2022-04-30 PROCEDURE — 72125 CT NECK SPINE W/O DYE: CPT | Mod: MG

## 2022-04-30 PROCEDURE — 82607 VITAMIN B-12: CPT

## 2022-04-30 PROCEDURE — 73030 X-RAY EXAM OF SHOULDER: CPT

## 2022-04-30 PROCEDURE — 85027 COMPLETE CBC AUTOMATED: CPT

## 2022-04-30 PROCEDURE — 73020 X-RAY EXAM OF SHOULDER: CPT

## 2022-04-30 PROCEDURE — 87635 SARS-COV-2 COVID-19 AMP PRB: CPT

## 2022-04-30 RX ORDER — APIXABAN 2.5 MG/1
1 TABLET, FILM COATED ORAL
Qty: 0 | Refills: 0 | DISCHARGE
Start: 2022-04-30

## 2022-04-30 RX ORDER — AMLODIPINE BESYLATE 2.5 MG/1
3 TABLET ORAL
Qty: 0 | Refills: 0 | DISCHARGE
Start: 2022-04-30

## 2022-04-30 RX ORDER — APIXABAN 2.5 MG/1
2.5 TABLET, FILM COATED ORAL
Refills: 0 | Status: DISCONTINUED | OUTPATIENT
Start: 2022-04-30 | End: 2022-04-30

## 2022-04-30 RX ORDER — AMLODIPINE BESYLATE 2.5 MG/1
7.5 TABLET ORAL DAILY
Refills: 0 | Status: DISCONTINUED | OUTPATIENT
Start: 2022-04-30 | End: 2022-04-30

## 2022-04-30 NOTE — PROGRESS NOTE ADULT - SUBJECTIVE AND OBJECTIVE BOX
INTERVAL HPI/OVERNIGHT EVENTS:  Pt seen and examined at bedside.     Allergies/Intolerance: Allergy Status Unknown  No Known Allergies      MEDICATIONS  (STANDING):  amLODIPine   Tablet 7.5 milliGRAM(s) Oral daily  apixaban 2.5 milliGRAM(s) Oral two times a day  atorvastatin 40 milliGRAM(s) Oral at bedtime  chlorhexidine 2% Cloths 1 Application(s) Topical daily  furosemide    Tablet 20 milliGRAM(s) Oral daily  metoprolol tartrate 25 milliGRAM(s) Oral two times a day  senna 2 Tablet(s) Oral at bedtime    MEDICATIONS  (PRN):  acetaminophen     Tablet .. 650 milliGRAM(s) Oral every 6 hours PRN Moderate Pain (4 - 6)  oxycodone    5 mG/acetaminophen 325 mG 1 Tablet(s) Oral every 12 hours PRN Severe Pain (7 - 10)        ROS: all systems reviewed and wnl      PHYSICAL EXAMINATION:  Vital Signs Last 24 Hrs  T(C): 36.8 (30 Apr 2022 10:09), Max: 36.9 (29 Apr 2022 23:55)  T(F): 98.2 (30 Apr 2022 10:09), Max: 98.4 (29 Apr 2022 23:55)  HR: 74 (30 Apr 2022 10:09) (74 - 96)  BP: 140/66 (30 Apr 2022 10:09) (134/69 - 183/91)  BP(mean): --  RR: 18 (30 Apr 2022 10:09) (18 - 18)  SpO2: 97% (30 Apr 2022 10:09) (96% - 98%)  CAPILLARY BLOOD GLUCOSE          04-29 @ 07:01  -  04-30 @ 07:00  --------------------------------------------------------  IN: 540 mL / OUT: 300 mL / NET: 240 mL    04-30 @ 07:01  -  04-30 @ 11:16  --------------------------------------------------------  IN: 220 mL / OUT: 200 mL / NET: 20 mL        GENERAL: stable, in chair, right arm sling in place, alert, daughter at bedside.   NECK: supple, No JVD  CHEST/LUNG: clear to auscultation bilaterally; no rales, rhonchi, or wheezing b/l  HEART: normal S1, S2  ABDOMEN: BS+, soft, ND, NT   EXTREMITIES:  pulses palpable; no clubbing, cyanosis, or edema b/l LEs  SKIN: no rashes or lesions      LABS:

## 2022-04-30 NOTE — PROGRESS NOTE ADULT - ASSESSMENT
89 year old female presents to ED c/o mechanical fall.  Pt reports that she was walking through a door had a mechanical fall landing on her right  side   She was unable to get up on her own and could not get up for 6 hours until family came. also c/o   right arm  pain.   She denies head injury or LOC. Does take Eliquis daily but is unsure why.         Plan: s/p fall, now with fx, right humerus. pl effusions,  bladder wall malignancy.  Ortho notes sling and no surgery needed for shoulder fracture.      TTE notes normal  EF.       Urology  eval. for  ? bladde cancer /  favor  no intervention  given advanced  age.     Per  son, he wants no intervention on bladder findings/ aware it may be malignancy.     PT eval, discharge to rehab when bed available. Took Eliquis for past year for PAF in the past with no complications.      Will resume at 2.5 mg bid.

## 2022-04-30 NOTE — PROGRESS NOTE ADULT - SUBJECTIVE AND OBJECTIVE BOX
CARDIOLOGY     PROGRESS  NOTE   ________________________________________________    CHIEF COMPLAINT:Patient is a 89y old  Female who presents with a chief complaint of fall (29 Apr 2022 19:23)  no complain.  	  REVIEW OF SYSTEMS:  CONSTITUTIONAL: No fever, weight loss, or fatigue  EYES: No eye pain, visual disturbances, or discharge  ENT:  No difficulty hearing, tinnitus, vertigo; No sinus or throat pain  NECK: No pain or stiffness  RESPIRATORY: No cough, wheezing, chills or hemoptysis; No Shortness of Breath  CARDIOVASCULAR: No chest pain, palpitations, passing out, dizziness, or leg swelling  GASTROINTESTINAL: No abdominal or epigastric pain. No nausea, vomiting, or hematemesis; No diarrhea or constipation. No melena or hematochezia.  GENITOURINARY: No dysuria, frequency, hematuria, or incontinence  NEUROLOGICAL: No headaches, memory loss, loss of strength, numbness, or tremors  SKIN: No itching, burning, rashes, or lesions   LYMPH Nodes: No enlarged glands  ENDOCRINE: No heat or cold intolerance; No hair loss  MUSCULOSKELETAL: No joint pain or swelling; No muscle, back, or extremity pain  PSYCHIATRIC: No depression, anxiety, mood swings, or difficulty sleeping  HEME/LYMPH: No easy bruising, or bleeding gums  ALLERGY AND IMMUNOLOGIC: No hives or eczema	    [ ] All others negative	  [ ] Unable to obtain    PHYSICAL EXAM:  T(C): 36.9 (04-29-22 @ 23:55), Max: 36.9 (04-29-22 @ 23:55)  HR: 96 (04-30-22 @ 05:39) (79 - 96)  BP: 183/91 (04-30-22 @ 05:39) (134/69 - 183/91)  RR: 18 (04-29-22 @ 23:55) (18 - 18)  SpO2: 96% (04-29-22 @ 23:55) (96% - 98%)  Wt(kg): --  I&O's Summary    29 Apr 2022 07:01  -  30 Apr 2022 07:00  --------------------------------------------------------  IN: 540 mL / OUT: 300 mL / NET: 240 mL        Appearance: Normal	  HEENT:   Normal oral mucosa, PERRL, EOMI	  Lymphatic: No lymphadenopathy  Cardiovascular: Normal S1 S2, No JVD, + murmurs, No edema  Respiratory: Lungs clear to auscultation	  Psychiatry: A & O x 3, Mood & affect appropriate  Gastrointestinal:  Soft, Non-tender, + BS	  Skin: No rashes, No ecchymoses, No cyanosis	  Neurologic: Non-focal  Extremities: Normal range of motion, No clubbing, cyanosis or edema  Vascular: Peripheral pulses palpable 2+ bilaterally    MEDICATIONS  (STANDING):  amLODIPine   Tablet 5 milliGRAM(s) Oral daily  atorvastatin 40 milliGRAM(s) Oral at bedtime  chlorhexidine 2% Cloths 1 Application(s) Topical daily  furosemide    Tablet 20 milliGRAM(s) Oral daily  heparin   Injectable 5000 Unit(s) SubCutaneous every 12 hours  metoprolol tartrate 25 milliGRAM(s) Oral two times a day  senna 2 Tablet(s) Oral at bedtime      TELEMETRY: 	    ECG:  	  RADIOLOGY:  OTHER: 	  	  LABS:	 	    CARDIAC MARKERS:                  proBNP:   Lipid Profile:   HgA1c:   TSH: Thyroid Stimulating Hormone, Serum: 6.70 uIU/mL (04-27 @ 14:46)          Assessment and plan  ---------------------------   89 year old female presents to ED c/o s/p  fall Pt reports that she was walking through a door had a mechanical fall landing on her r ight  side   She was unable to get up on her own and could not get up for 6 hours until family came    also c/o RUE pain.   She denies head injury or LOC. Does take Eliquis daily but is unsure why.   pt denies of any chest pain.  pt with s/p fall ?syncope with humeral fx and pleural effusion  on cardiac exam with heart murmur, r/o AS  awaiting echo  asa daily  ortho eval  dvt prophylaxis  tsh  b12 level  rec start on bp meds  echo noted  please check orthosttaic, awaiting  will adjust bp meds

## 2022-06-01 NOTE — PATIENT PROFILE ADULT - NSTRANSFERBELONGINGSDISPO_GEN_A_NUR
06/01/22 9:51 AM     See documentation in the VB [employer] SmartForm       Jolynn Velarde not applicable

## 2022-08-04 NOTE — DISCHARGE NOTE PROVIDER - CARE PROVIDER_API CALL
Spoke with pharmacy michael Davis at [facility], she will run through insurance to see if over ride is possible for lost script.   Susan will contact patient and inform.   Message sent to patient to wait for call    Rai Polanco)  Orthopaedic Surgery  [address] [place] [address], [address]  [place] [address]  Phone: [phone]  Fax: [phone]  Established Patient  Follow Up Time: Routine    Buck Viveros)  Urology  [address], [facility] [address]  [place] [address]  Phone: [phone]  Fax: [phone]  Established Patient  Follow Up Time: Routine

## 2022-09-12 NOTE — DISCHARGE NOTE ADULT - BECAUSE OF A PHYSICAL, MENTAL OR EMOTIONAL CONDITION, DO YOU HAVE DIFFICULTY DOING  ERRANDS ALONE LIKE VISITING A DOCTOR'S OFFICE OR SHOPPING (15 YEARS AND OLDER)
No. SAM screening performed.  STOP BANG Legend: 0-2 = LOW Risk; 3-4 = INTERMEDIATE Risk; 5-8 = HIGH Risk
No

## 2022-10-08 NOTE — ED PROVIDER NOTE - PHYSICAL EXAMINATION
no palpitations/no peripheral edema
CONSTITUTIONAL: Patient is awake, alert and oriented x 3. Patient is well appearing and in no acute distress  HEAD: NCAT  NECK: supple, FROM  LUNGS: CTA B/L, no wheezing or rales, no chest wall ttp  HEART: RRR.+S1S2 no murmurs  ABDOMEN: Small area ecchymosis right side abdomen, soft, non-distended, nttp, no rebound or guarding  EXTREMITY: no edema or calf tenderness b/l, FROM left upper and b/l lower ext. RUE with slight swelling overlying proximal humerus with ttp. No midline cervical, thoracic or lumbar ttp. Pelvis stable   SKIN: with no rash or lesions  NEURO: No focal deficits

## 2023-02-17 NOTE — ED ADULT TRIAGE NOTE - LOCATION:
[facility] MEDICINE DISCHARGE SUMMARY   Patient: Ale Wolfe  Discharge Physician: Chelsey Herrera MD   YOB: 1941  Admission Date: 2/10/2023    MRN: [id number]  Discharge date: 2/17/2023   Admitting Physician: Eva Guerra MD Inpatient LOS: 0      Indication for Admission:  Adult antisocial behavior [Z72.811]  Acute pain of right knee [M25.561]  Urinary tract infection without hematuria, site unspecified [N39.0]    Discharge Diagnoses:   Generalized weakness  Bacteriuria, recurrent - cultures nonspecific  Chronic Right knee pain and instability - severe OA, improved function s/p steroid injection  DM type II - on insulin - hyperglycemia due to steroids is better  CKD IIIb  Essential HTN  GERD without esophagitis  Paroxysmal AFib  Wheelchair dependent  Adult antisocial behavior   Rheumatoid arthritis multiple joints  Hyperkalemia - resolved  Leukocytosis - expected from steroid injection, improved today  Dysuria - culture nonspecific    Hospital Course:   Patient assigned observation, admitted from the ED because she agreed to [facility] placement. SW started [facility] referrals. She worked with therapies but had a difficult time due to right knee pain; ortho consulted and are still recommending TKR but she is not agreeable to that. Steroid injection performed this admission with some improvement in pain/function of the right knee. She has been more able to participate with therapies and now is no longer agreeable to [facility] placement, says she will go home with [facility] despite our recommendation for [facility]. A [facility] agency is found for patient with services to start 2/20. She is advised that the steroid injection is only very temporary and will not be a long-term treatment for her knee pain. Blood sugars did increase after the steroid, are starting to normalize again.    Patient has had intermittent dysuria and abnormal UA's for some time. She got a dose of fosfomycin in the ED on 2/9, cultures  from that visit is nonspecific. ID was consulted, did not feel she initially needed any further abx treatment. detrol was started for bladder spasms, but symptoms persisted. New UA was obtained (she would not allow straight cath) and ID reviewed this. She got another dose of fosfomycin on 2/14 and had since had some improvement in her dysuria. A follow up dose of fosfomycin was recommended this morning despite the nonspecific findings on her culture. Follow up with urology clinic for recurrent/chronic cystitis is recommended.    Labs and vitals are stable. She has been cooperative with cares and therapies this admission. She continues to refuse [facility] placement. Care coordinated with her PCP who has stated that she needs to keep her follow up appointment on 2/21 or she will have to find a new PCP; transportation has been arranged for this appointment   Patient stable to discharge with close follow up; prognosis guarded     Consults:   ID, orthopedic surgery   PT, OT, SW, CM    Discharge Exam:  Visit Vitals  /80 (BP Location: RFA - Right forearm, Patient Position: Semi-Arthur's)   Pulse 60   Temp 97.7 °F (36.5 °C) (Oral)   Resp 18   Ht 5' 6\" (1.676 m)   Wt 86.1 kg (189 lb 13.1 oz)   SpO2 100%   BMI 30.64 kg/m²     General: Looks chronically ill well developed, well nourished and no apparent distress   Eyes: bilaterally without conjunctival erythema, no edema  CV: regular rate and rhythm. No chest wall tenderness to palpation  Resp: clear to auscultation bilaterally - no wheezing or rales  Abd: soft, nontender, nondistended and good bowel sounds. No suprapubic tenderness to palpation  Ext: no clubbing, cyanosis, or ischemia and ulnar deviation both hands, right knee with bandage from injection- better leg movement and knee ROM, edema absent  and posterior tibial pulses equal bilaterally  and dorsalis pedis pulses equal bilaterally . Chronic dryness/stasis changes lower legs  Skin: no rashes, lesions, or ulcers  noted. Some mild redness in perineal area from sweating but no discharge  Neuro: CN 2-12 grossly intact and no focal deficits noted  Psych: oriented to time, place and person and normal mood and affect     Significant Diagnostic Studies:     Lab Results   Component Value Date    SODIUM 138 02/14/2023    POTASSIUM 4.8 02/14/2023    CHLORIDE 101 02/14/2023    CO2 21 02/14/2023    ANIONGAP 21 (H) 02/14/2023    GLUCOSE 266 (H) 02/14/2023    BUN 57 (H) 02/14/2023    CREATININE 1.49 (H) 02/14/2023    CALCIUM 9.4 02/14/2023    ALBUMIN 2.5 (L) 02/09/2023    MG 1.9 02/13/2023    BILIRUBIN 0.3 02/09/2023    ALKPT 74 02/09/2023    LACTA 1.3 04/04/2022    PCT 0.16 (H) 05/21/2022    AST 19 02/09/2023    GPT 12 02/09/2023    PHOS 2.7 02/15/2020    LIPA 163 02/02/2023    INR 1.0 02/02/2023     Lab Results   Component Value Date    PTT 27 02/02/2023    WBC 12.1 (H) 02/15/2023    HGB 12.4 02/15/2023    HCT 38.2 02/15/2023     02/15/2023    RESR 80 (H) 08/23/2022    CPK 25 (L) 05/21/2022    URIC 5.2 08/23/2022    CRP 4.7 (H) 08/23/2022    OSMO 283 08/18/2018    TSH 0.277 (L) 09/17/2022    HGBA1C 8.0 (H) 01/16/2023     Urinalysis    Lab Results   Component Value Date    UAPP Turbid 02/14/2023    UGLU 150 (A) 02/14/2023    UPROT 100 (A) 02/14/2023    USPG 1.012 02/14/2023    UWBC Large (A) 02/14/2023    URBC Moderate (A) 02/14/2023    UNITR Positive (A) 02/14/2023    LEUKA >100 (A) 02/14/2023    UBILI Negative 02/14/2023    UPH 6.0 02/14/2023    UROB 0.2 02/14/2023    UBACTRA Moderate (A) 02/14/2023       Iron Panel    Lab Results   Component Value Date    IRON 36 (L) 12/12/2022    TIBC 265 12/12/2022    PST 14 (L) 12/12/2022    FERR 146 12/12/2022     Cultures:  COVID negative             Urine 2/9 - multiple organisms, no predominant             Blood 2/9 - NGTD             Urine 2/14 - mixed, multiple organism    CXR 2/9 - The heart is slightly enlarged but stable. The aorta slightly tortuous.  Mild interstitial markings are  again seen. No large effusions..  MRI Right knee  - 1. Severe tricompartmental chondromalacia.  2. Degenerative-type tears of both the medial and lateral menisci.    Discharge Medications:  See AVS     Disposition: Home with home health care    Advanced Directives/Goals of Care:  Patient is decisional: Yes  Power Of  designee/healthcare agent - Not activated:  Code Status: Full Resuscitation       DISCHARGE RECOMMENDATIONS       As part of your transition home, we are providing you with this set of discharge instructions, as a guide to help you in that process. In addition to the care provided during your hospital stay, there may be upcoming clinic visits, laboratory appointments, and/or results noted on this After Visit Summary (AVS).     To assist the physicians caring for you during this transition, we would like you remind you of the followin. Please call a Provider for help if you experience any of the following: Any questions or concerns  2. Activity Instructions: Normal activity as tolerated- use walker, go slow, take frequent breaks  3. Dressing/Wound Instructions:  Keep perineal area cool and dry to prevent yeast/vulvar irritation and itching  4. Lifting & Weight-bearing Instructions: No restrictions  5. Diet:  Diabetic diet, push fluids to be sure you are staying hydrated  6. Miscellaneous:   - empty your bladder fully and often for UTI prevention  - check blood sugars before every meal, you might need a little higher insulin than you are used to for a few days, but it should continue to improve   7. Medication changes:   -added: oxybutynin, tramadol, hydroxyzine  -removed: brilinta  -changed: NA  8. Follow up with   - Eladio Moreno MD on 2023 at 2:15 pm.  Transportation has been arranged. Per Dr. Moreno - If you No-Show this appointment you will have to find a new PCP  - Dr. Tesfaye as needed for right knee arthritis care  - Suggest re-eval at Urology Clinic (Dr. Prema Bull et. al)   Right arm; for ongoing bladder issues   - Rheumatologist as needed for ongling RA issues    If you have any questions 24-48 hours after discharge, please feel free to contact the hospital unit/department you were discharged from.      Time spent on discharge was <30 minutes. Greater than 50% of this face-to-face visit was spent on counseling and/or coordination of care. Patient verbalized understanding and is in agreement with treatment plan.    Copy of note will be sent to PCP for discharge recommendations.    Chelsey Herrera MD     Left arm;

## 2023-06-21 ENCOUNTER — NON-APPOINTMENT (OUTPATIENT)
Age: 89
End: 2023-06-21

## 2023-07-24 PROBLEM — Z00.00 ENCOUNTER FOR PREVENTIVE HEALTH EXAMINATION: Status: ACTIVE | Noted: 2023-07-24

## 2023-09-24 ENCOUNTER — NON-APPOINTMENT (OUTPATIENT)
Age: 89
End: 2023-09-24

## 2023-11-13 NOTE — ED ADULT NURSE NOTE - CAS TRG GENERAL AIRWAY, MLM
c/o seizure last friday and family drove her to [facility] but left without being seen and thinks she had another seizure today, ambulatory to Triage in no distress Patent

## 2025-01-11 ENCOUNTER — TRANSCRIPTION ENCOUNTER (OUTPATIENT)
Age: 89
End: 2025-01-11

## 2025-01-11 ENCOUNTER — EMERGENCY (EMERGENCY)
Facility: HOSPITAL | Age: 89
LOS: 1 days | Discharge: HOME | End: 2025-01-11
Attending: EMERGENCY MEDICINE
Payer: MEDICARE

## 2025-01-11 VITALS
WEIGHT: 160.06 LBS | HEART RATE: 70 BPM | DIASTOLIC BLOOD PRESSURE: 78 MMHG | TEMPERATURE: 98 F | OXYGEN SATURATION: 98 % | HEIGHT: 65 IN | RESPIRATION RATE: 20 BRPM | SYSTOLIC BLOOD PRESSURE: 170 MMHG

## 2025-01-11 DIAGNOSIS — Z90.12 ACQUIRED ABSENCE OF LEFT BREAST AND NIPPLE: Chronic | ICD-10-CM

## 2025-01-11 DIAGNOSIS — Z98.890 OTHER SPECIFIED POSTPROCEDURAL STATES: Chronic | ICD-10-CM

## 2025-01-11 LAB
ALBUMIN SERPL ELPH-MCNC: 3.9 G/DL — SIGNIFICANT CHANGE UP (ref 3.3–5)
ALP SERPL-CCNC: 77 U/L — SIGNIFICANT CHANGE UP (ref 40–120)
ALT FLD-CCNC: 14 U/L — SIGNIFICANT CHANGE UP (ref 10–45)
ANION GAP SERPL CALC-SCNC: 15 MMOL/L — SIGNIFICANT CHANGE UP (ref 5–17)
APPEARANCE UR: CLEAR — SIGNIFICANT CHANGE UP
AST SERPL-CCNC: 27 U/L — SIGNIFICANT CHANGE UP (ref 10–40)
BACTERIA # UR AUTO: ABNORMAL /HPF
BASOPHILS # BLD AUTO: 0.06 K/UL — SIGNIFICANT CHANGE UP (ref 0–0.2)
BASOPHILS NFR BLD AUTO: 0.6 % — SIGNIFICANT CHANGE UP (ref 0–2)
BILIRUB SERPL-MCNC: 0.9 MG/DL — SIGNIFICANT CHANGE UP (ref 0.2–1.2)
BILIRUB UR-MCNC: NEGATIVE — SIGNIFICANT CHANGE UP
BUN SERPL-MCNC: 34 MG/DL — HIGH (ref 7–23)
CALCIUM SERPL-MCNC: 9.5 MG/DL — SIGNIFICANT CHANGE UP (ref 8.4–10.5)
CAST: 0 /LPF — SIGNIFICANT CHANGE UP (ref 0–4)
CHLORIDE SERPL-SCNC: 105 MMOL/L — SIGNIFICANT CHANGE UP (ref 96–108)
CO2 SERPL-SCNC: 20 MMOL/L — LOW (ref 22–31)
COLOR SPEC: YELLOW — SIGNIFICANT CHANGE UP
CREAT SERPL-MCNC: 0.99 MG/DL — SIGNIFICANT CHANGE UP (ref 0.5–1.3)
DIFF PNL FLD: NEGATIVE — SIGNIFICANT CHANGE UP
EGFR: 50 ML/MIN/1.73M2 — LOW
EOSINOPHIL # BLD AUTO: 0.05 K/UL — SIGNIFICANT CHANGE UP (ref 0–0.5)
EOSINOPHIL NFR BLD AUTO: 0.5 % — SIGNIFICANT CHANGE UP (ref 0–6)
GLUCOSE SERPL-MCNC: 101 MG/DL — HIGH (ref 70–99)
GLUCOSE UR QL: NEGATIVE MG/DL — SIGNIFICANT CHANGE UP
HCT VFR BLD CALC: 41.2 % — SIGNIFICANT CHANGE UP (ref 34.5–45)
HGB BLD-MCNC: 13.4 G/DL — SIGNIFICANT CHANGE UP (ref 11.5–15.5)
IMM GRANULOCYTES NFR BLD AUTO: 0.6 % — SIGNIFICANT CHANGE UP (ref 0–0.9)
KETONES UR-MCNC: 15 MG/DL
LEUKOCYTE ESTERASE UR-ACNC: ABNORMAL
LYMPHOCYTES # BLD AUTO: 1.29 K/UL — SIGNIFICANT CHANGE UP (ref 1–3.3)
LYMPHOCYTES # BLD AUTO: 13.6 % — SIGNIFICANT CHANGE UP (ref 13–44)
MCHC RBC-ENTMCNC: 30.5 PG — SIGNIFICANT CHANGE UP (ref 27–34)
MCHC RBC-ENTMCNC: 32.5 G/DL — SIGNIFICANT CHANGE UP (ref 32–36)
MCV RBC AUTO: 93.6 FL — SIGNIFICANT CHANGE UP (ref 80–100)
MONOCYTES # BLD AUTO: 0.68 K/UL — SIGNIFICANT CHANGE UP (ref 0–0.9)
MONOCYTES NFR BLD AUTO: 7.2 % — SIGNIFICANT CHANGE UP (ref 2–14)
NEUTROPHILS # BLD AUTO: 7.37 K/UL — SIGNIFICANT CHANGE UP (ref 1.8–7.4)
NEUTROPHILS NFR BLD AUTO: 77.5 % — HIGH (ref 43–77)
NITRITE UR-MCNC: NEGATIVE — SIGNIFICANT CHANGE UP
NRBC # BLD: 0 /100 WBCS — SIGNIFICANT CHANGE UP (ref 0–0)
PH UR: 6.5 — SIGNIFICANT CHANGE UP (ref 5–8)
PLATELET # BLD AUTO: 140 K/UL — LOW (ref 150–400)
POTASSIUM SERPL-MCNC: 4.8 MMOL/L — SIGNIFICANT CHANGE UP (ref 3.5–5.3)
POTASSIUM SERPL-SCNC: 4.8 MMOL/L — SIGNIFICANT CHANGE UP (ref 3.5–5.3)
PROT SERPL-MCNC: 7.1 G/DL — SIGNIFICANT CHANGE UP (ref 6–8.3)
PROT UR-MCNC: NEGATIVE MG/DL — SIGNIFICANT CHANGE UP
RBC # BLD: 4.4 M/UL — SIGNIFICANT CHANGE UP (ref 3.8–5.2)
RBC # FLD: 13.4 % — SIGNIFICANT CHANGE UP (ref 10.3–14.5)
RBC CASTS # UR COMP ASSIST: 2 /HPF — SIGNIFICANT CHANGE UP (ref 0–4)
SODIUM SERPL-SCNC: 140 MMOL/L — SIGNIFICANT CHANGE UP (ref 135–145)
SP GR SPEC: 1.01 — SIGNIFICANT CHANGE UP (ref 1–1.03)
SQUAMOUS # UR AUTO: 9 /HPF — HIGH (ref 0–5)
UROBILINOGEN FLD QL: 0.2 MG/DL — SIGNIFICANT CHANGE UP (ref 0.2–1)
WBC # BLD: 9.51 K/UL — SIGNIFICANT CHANGE UP (ref 3.8–10.5)
WBC # FLD AUTO: 9.51 K/UL — SIGNIFICANT CHANGE UP (ref 3.8–10.5)
WBC UR QL: 30 /HPF — HIGH (ref 0–5)

## 2025-01-11 PROCEDURE — 73020 X-RAY EXAM OF SHOULDER: CPT | Mod: 26,LT,XE

## 2025-01-11 PROCEDURE — 73030 X-RAY EXAM OF SHOULDER: CPT | Mod: 26,LT

## 2025-01-11 PROCEDURE — 70450 CT HEAD/BRAIN W/O DYE: CPT | Mod: 26

## 2025-01-11 PROCEDURE — 73090 X-RAY EXAM OF FOREARM: CPT | Mod: 26,LT,77

## 2025-01-11 PROCEDURE — 73070 X-RAY EXAM OF ELBOW: CPT | Mod: 26,LT

## 2025-01-11 PROCEDURE — 99221 1ST HOSP IP/OBS SF/LOW 40: CPT

## 2025-01-11 PROCEDURE — 73100 X-RAY EXAM OF WRIST: CPT | Mod: 26,XE,LT

## 2025-01-11 PROCEDURE — 99223 1ST HOSP IP/OBS HIGH 75: CPT | Mod: FS

## 2025-01-11 PROCEDURE — 72125 CT NECK SPINE W/O DYE: CPT | Mod: 26

## 2025-01-11 PROCEDURE — 73090 X-RAY EXAM OF FOREARM: CPT | Mod: 26,LT

## 2025-01-11 PROCEDURE — 73110 X-RAY EXAM OF WRIST: CPT | Mod: 26,LT

## 2025-01-11 PROCEDURE — 73060 X-RAY EXAM OF HUMERUS: CPT | Mod: 26,LT

## 2025-01-11 RX ORDER — METOPROLOL TARTRATE 50 MG
25 TABLET ORAL
Refills: 0 | Status: DISCONTINUED | OUTPATIENT
Start: 2025-01-11 | End: 2025-01-15

## 2025-01-11 RX ORDER — ACETAMINOPHEN 80 MG/.8ML
1000 SOLUTION/ DROPS ORAL ONCE
Refills: 0 | Status: COMPLETED | OUTPATIENT
Start: 2025-01-11 | End: 2025-01-11

## 2025-01-11 RX ORDER — APIXABAN 5 MG/1
2.5 TABLET, FILM COATED ORAL EVERY 12 HOURS
Refills: 0 | Status: DISCONTINUED | OUTPATIENT
Start: 2025-01-11 | End: 2025-01-15

## 2025-01-11 RX ORDER — METOPROLOL TARTRATE 50 MG
25 TABLET ORAL ONCE
Refills: 0 | Status: COMPLETED | OUTPATIENT
Start: 2025-01-11 | End: 2025-01-11

## 2025-01-11 RX ADMIN — ACETAMINOPHEN 1000 MILLIGRAM(S): 80 SOLUTION/ DROPS ORAL at 22:58

## 2025-01-11 RX ADMIN — ACETAMINOPHEN 400 MILLIGRAM(S): 80 SOLUTION/ DROPS ORAL at 15:35

## 2025-01-11 RX ADMIN — Medication 25 MILLIGRAM(S): at 21:17

## 2025-01-11 RX ADMIN — Medication 2.5 MILLIGRAM(S): at 21:17

## 2025-01-11 NOTE — ED PROVIDER NOTE - CLINICAL SUMMARY MEDICAL DECISION MAKING FREE TEXT BOX
89-year-old female with a history of bladder cancer not pursuing treatment, paroxysmal atrial fibrillation on Eliquis, HTN, HLD, osteoporosis presenting after an unwitnessed fall this afternoon with +LOC, and postconcussive amnesia.  Patient endorsing left wrist pain.  Patient is hemodynamically stable, afebrile.  Physical exam was notable for ecchymosis to the left orbit with tenderness to palpation, tenderness to palpation of left forearm and wrist with left wrist ecchymosis and hematoma.  Patient with history of multiple mechanical falls in the past.  Will obtain basic labs, UA due to history of previous UTIs, x-rays of left upper extremity, and CT head and neck.  Tylenol for pain.

## 2025-01-11 NOTE — ED PROVIDER NOTE - NSICDXPASTMEDICALHX_GEN_ALL_CORE_FT
PAST MEDICAL HISTORY:  Atrial fibrillation, unspecified type     GERD (gastroesophageal reflux disease)     Hypercholesterolemia     Hypertension, unspecified type     Osteoporosis     TIA (transient ischemic attack)

## 2025-01-11 NOTE — ED CDU PROVIDER DISPOSITION NOTE - NSFOLLOWUPINSTRUCTIONS_ED_ALL_ED_FT
Follow-up with your primary care provider within 1 to 3 days.  Of note your CT imaging revealed an incidental finding of a thyroid nodule.  A nonemergent outpatient thyroid ultrasound is recommended for further evaluation.    Additionally follow-up with orthopedics in 7 to 10 days.  Call for an appointment.    Artur Ruth  Orthopaedic Trauma  [address] [facility] [address] [facility], [address]  [place] [address]  Phone: [phone]  Fax: [phone]    Do not lift anything with your left arm.  Keep your arm within the splint at all times.  Keep splint clean and dry.  Wear the sling while walking but may remove when resting.  Use cool compresses and elevate the arm to help with swelling.  It is important that you frequently wiggle your fingers to help with blood flow to the hand.    Pain can be managed with Tylenol and Ibuprofen over the counter as directed.    Continue to take all other medications as directed.    Return to the emergency room immediately if your symptoms worsen or persist, or if you have a high or concerning fever, new or worsening pain in the affected area, numbness or tingling of the affected area, inability to move the affected area, increased swelling, redness of affected area, or if any other concerning or questionable symptoms. Follow-up with your primary care provider within 1 to 3 days.  Of note your CT imaging revealed an incidental finding of a thyroid nodule.  A nonemergent outpatient thyroid ultrasound is recommended for further evaluation.    Additionally follow-up with orthopedics in 7 to 10 days.  Call for an appointment.    Artur Ruth  Orthopaedic Trauma  [address] [facility], [address]  [place] [address]  Phone: [phone]  Fax: [phone]    You should take keflex 4x a day for 5 days    We will call about your urine culture if keflex will not work    Do not lift anything with your left arm.  Keep your arm within the splint at all times.  Keep splint clean and dry.  Wear the sling while walking but may remove when resting.  Use cool compresses and elevate the arm to help with swelling.  It is important that you frequently wiggle your fingers to help with blood flow to the hand.    Pain can be managed with Tylenol and Ibuprofen over the counter as directed.    Continue to take all other medications as directed.    Return to the emergency room immediately if your symptoms worsen or persist, or if you have a high or concerning fever, new or worsening pain in the affected area, numbness or tingling of the affected area, inability to move the affected area, increased swelling, redness of affected area, or if any other concerning or questionable symptoms. Follow-up with your primary care provider within 1 to 3 days.  Of note your CT imaging revealed an incidental finding of a thyroid nodule.  A nonemergent outpatient thyroid ultrasound is recommended for further evaluation.    Additionally follow-up with orthopedics in 7 to 10 days.  Call for an appointment.    Artur Ruth  Orthopaedic Trauma  [address] [facility], [address]  [place] [address]  Phone: [phone]  Fax: [phone]    We will call about your urine culture if you need antibiotics    Do not lift anything with your left arm.  Keep your arm within the splint at all times.  Keep splint clean and dry.  Wear the sling while walking but may remove when resting.  Use cool compresses and elevate the arm to help with swelling.  It is important that you frequently wiggle your fingers to help with blood flow to the hand.    Pain can be managed with Tylenol and Ibuprofen over the counter as directed.    Continue to take all other medications as directed.    Return to the emergency room immediately if your symptoms worsen or persist, or if you have a high or concerning fever, new or worsening pain in the affected area, numbness or tingling of the affected area, inability to move the affected area, increased swelling, redness of affected area, or if any other concerning or questionable symptoms.

## 2025-01-11 NOTE — ED CDU PROVIDER DISPOSITION NOTE - CARE PROVIDER_API CALL
Artur Ruth  Orthopaedic Trauma  [address] [facility] [address]  [place] [address]  Phone: [phone]  Fax: [phone]  Follow Up Time:
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
## 2025-01-11 NOTE — ED ADULT NURSE REASSESSMENT NOTE - NS ED NURSE REASSESS COMMENT FT1
Report received from YOLANDA Fritz. On re-assessment pt currently appears comfortable resting in stretcher in room with appropriate side rails up for safety. pt is awake and alert, vital signs stable, breathing even and unlabored, NAD noted at this time. Plan of care discussed with pt and family at bedside. Pt oriented to CDU area and given call bell, placed in position of comfort.

## 2025-01-11 NOTE — ED ADULT NURSE REASSESSMENT NOTE - NS ED NURSE REASSESS COMMENT FT1
Report received from Sunitha SWANN RN . Pt AAOx3, NAD, resp nonlabored, skin warm/dry, resting comfortably in bed with family at bedside. Pt denies headache, dizziness, chest pain, palpitations, SOB, abd pain, n/v/d, urinary symptoms, fevers, chills, weakness at this time. Pt awaiting dispo. Safety maintained.

## 2025-01-11 NOTE — ED CDU PROVIDER INITIAL DAY NOTE - PHYSICAL EXAMINATION
GEN: Pt in NAD, alert.  PSYCH: Affect and mood appropriate.  EYES: Sclera white w/o injection, EOMI, PERRLA. Left periorbital ecchymosis.  ENT: Neck supple. Airway patent.  RESP: CTA b/l.  CARDIAC: RRR, S1, S2, no M/G/R.  ABD: Soft, NT.  MSK: Left upper extremity splinted distally and in sling.  Neurovascularly intact at the digits, no finger edema and good cap refill.  Moving all other extremities.  No midline spinal TTP  NEURO: Nonfocal.  VASC: Strong distal pulses.  SKIN: See above.

## 2025-01-11 NOTE — ED CDU PROVIDER INITIAL DAY NOTE - OBJECTIVE STATEMENT
89-year-old R hand dominant female with a history of bladder cancer not pursuing treatment, paroxysmal atrial fibrillation on Eliquis, HTN, HLD, osteoporosis presenting after an unwitnessed fall this afternoon.  As per aide patient was sitting at the edge of the bed and she left the room briefly to grab something and heard a thud, went back into the room and saw the patient on the ground.  Patient was unresponsive for <5 seconds and then she called 911. No nausea/vomiting, headache. Patient does not recall the fall.  Patient was in front of the wall by her bed likely fell forward per aid. patient has not had any recent illness, other falls/injuries, shortness of breath, chest pain, dysuria, fever, night sweats, chills.  In ED, labs nonactionable, UA dirty catch and patient without urinary symptoms, no evidence of ICH on CT head, left distal radius fracture reduced by orthopedics, ortho recommended Keflex but both patient and son at bedside declining antibiotics at this time and are aware of risk/complication of infection.  CDU for pain control, PT/case management for specialized walker.  Patient has full-time aide at home.

## 2025-01-11 NOTE — ED PROVIDER NOTE - OBJECTIVE STATEMENT
89-year-old female with a history of bladder cancer not pursuing treatment, paroxysmal atrial fibrillation on Eliquis, HTN, HLD, osteoporosis presenting after an unwitnessed fall this afternoon.  Aide at bedside patient.  She states patient was sitting at the edge of the bed and she left the room briefly to grab something and heard a thud, went back into the room and saw the patient on the ground.  Patient was unresponsive for <5 seconds and then she called 911. No nausea/vomiting, headache. Patient does not recall the fall.  Patient was in front of the wall by her bed likely fell forward per aid. patient has not had any recent illness, other falls/injuries, shortness of breath, chest pain, dysuria, fever, night sweats, chills

## 2025-01-11 NOTE — ED PROVIDER NOTE - PHYSICAL EXAMINATION
Vital signs: Reviewed.   General: Well-Appearing, in no acute distress  Head: L orbital ecchymosis with TTP of orbital bone  Eyes: PEARLA; Normal eyelids, conjunctiva, and sclera; no discharge  Neck: Normal appearing; Trachea midline  Spine: No midline spinal TTP, stepoffs or deformities  Oral cavity: Lips, oropharynx without abnormalities   Cardiovascular: Regular rate and rhythm, no murmurs rubs or gallops were appreciated. No JVD.  Lungs: Normal rate, rhythm and depth of respirations; no accessory muscle use; no wheezes, rales, or rhonchi, and no evidence of airway compromise  Abdomen: Soft, nondistended, nontender x4 quadrants  Neuro:  moving all 4 extremities, mentating appropriately, CN2-12 grossly intact, no focal neurologic deficits. UE and LE dermatomes and myotome wnl  Derm: w/d/i  MSK: TTP of L forearm, and wrist with wrist hematoma and ecchymosis, neurovascularly intact distally.  Psych: mood and affect appropriate and congruent

## 2025-01-11 NOTE — CONSULT NOTE ADULT - SUBJECTIVE AND OBJECTIVE BOX
89y Female RHD community ambulator with walker with a history of bladder cancer not pursuing treatment, paroxysmal atrial fibrillation on Eliquis, HTN, HLD, osteoporosis presenting after an unwitnessed fall this afternoon.  Aide at bedside patient.  She states patient was sitting at the edge of the bed and she left the room briefly to grab something and heard a thud, went back into the room and saw the patient on the ground.  Patient was unresponsive for <5 seconds and then she called 911. No nausea/vomiting, headache. Patient does not recall the fall.  Patient was in front of the wall by her bed likely fell forward per aid. patient has not had any recent illness, other falls/injuries, numbness/tingling, shortness of breath, chest pain, dysuria, fever, night sweats, chills.    HEALTH ISSUES - PROBLEM Dx:        MEDICATIONS  (STANDING):    Allergies    No Known Allergies  Allergy Status Unknown    Intolerances                            13.4   9.51  )-----------( 140      ( 11 Jan 2025 15:14 )             41.2     11 Jan 2025 15:14    140    |  105    |  34     ----------------------------<  101    4.8     |  20     |  0.99     Ca    9.5        11 Jan 2025 15:14  Mg     2.2       11 Jan 2025 15:14    TPro  7.1    /  Alb  3.9    /  TBili  0.9    /  DBili  x      /  AST  27     /  ALT  14     /  AlkPhos  77     11 Jan 2025 15:14      Vital Signs Last 24 Hrs  T(C): 36.7 (01-11-25 @ 15:03), Max: 36.7 (01-11-25 @ 15:03)  T(F): 98 (01-11-25 @ 15:03), Max: 98 (01-11-25 @ 15:03)  HR: 60 (01-11-25 @ 15:03) (60 - 70)  BP: 178/86 (01-11-25 @ 15:03) (170/78 - 178/86)  BP(mean): --  RR: 18 (01-11-25 @ 15:03) (18 - 20)  SpO2: 99% (01-11-25 @ 15:03) (98% - 99%)    Physical Exam  Gen: NAD, awake and alert.    LUE:  Wrist swelling noted  Skin intact  Wrist ROM limited due to pain, able to range elbow and shoulder well without pain  +TTP distal radius, no other TTP throughout LUE  Motor: +Ax/Musc/Med/Rad/AIN/PIN  SILT C5-T1  Extremity warm/well perfused  2+ radial pulse  Compartments soft/compressive    Secondary Assessment:  NC/AT, NTTP of clavicles, NTTP of C-,T-,L-Spine, NTTP of Pelvis  RUE: NTTP of Shoulder, Elbow, Wrist, Hand; NT with AROM/PROM of Shoulder, Elbow, Wrist, Hand; AIN/PIN/Med/Uln/Msc/Rad/Ax intact  LLE: Able to SLR, NT with Log Roll, NT with Heel Strike, NTTP of Hip, Knee, Ankle, foot; NT with AROM/PROM of Hip, Knee, Ankle, footQ/H/Gsc/TA/EHL/FHL intact  RLE: Able to SLR, NT with Log Roll, NT with Heel Strike, NTTP of Hip, Knee, Ankle, foot; NT with AROM/PROM of Hip, Knee, Ankle, footQ/H/Gsc/TA/EHL/FHL intact      Imaging:  Xray L wrist: Dorsally displaced distal radius fracture    Procedure:  Risks and benefits for the procedure were explained to the patient. An injection was offered to the patient for analgesia prior to procedure. The patient expressed understanding and agreed with the plan. The patient gave verbal consent for the injection and procedure. The skin was prepped in sterile fashion with alcohol scrub. The fracture site was then injected with 10mL 1% lidocaine without epinephrine. Time was allowed for anesthetic effect to occur. Once the patient was comfortable, the extremity was generally cleaned. The fracture was then manipulated/closed reduced. Superficial skin tears were formed during reduction attempts, which were covered with xeroform. The extremity was wrapped with Webril, with care to pad the bony prominences over the olecranon and medial and lateral epicondyles. A plaster splint was applied; wrapped with Webril and an ace wrap; and molded until hardened. Care was taken to avoid sharp edges and to protect the skin. Neurovascular exam was unchanged after the procedure. Post reduction films were ordered and demonstrated adequate reduction of the fracture.      A/P: 89y Female with R distal radius fracture, closed.    Keflex 500mg QID for 5 days  Pain control  NWB LUE in splint, keep c/d/I,   Sling while ambulating, otherwise for comfort  Ice/elevation  Si/sx compartment syndrome discussed with patient, told to return to ED if exhibit any  Possible need for surgical intervention in future discussed, all questions answered  Follow up with Dr. Artur Ruth within 7-10 days , call office for appointment.  Ortho stable for DC

## 2025-01-11 NOTE — ED PROVIDER NOTE - CARE PLAN
1 Principal Discharge DX:	Traumatic ecchymosis of orbital rim, initial encounter   Principal Discharge DX:	Facial contusion, initial encounter   Principal Discharge DX:	Facial contusion, initial encounter  Secondary Diagnosis:	Displaced fracture of distal end of left radius

## 2025-01-11 NOTE — ED PROVIDER NOTE - PROGRESS NOTE DETAILS
Splint placed for radial fracture by orthopedics after reduction.  Patient has 24/7 aide, however uses walker to ambulate.  She will be unable to ambulate with radial fracture.  Contacted CDU for possible admission there pending obtaining platform walker.  Orthopedics recommending left shoulder x-ray.  Patient will require Keflex on discharge if discharge is appropriate. spoke to family and patient regarding whether or not to get CT maxillofacial to evaluate for sinus fluid ISO L orbital ecchymosis and TTP. After discussion, they would like to defer CT of the facial bones and monitor for symptoms which were discussed at bedside and f/u with PCP. Also CT neg. Reeval showed no midline TTP. Full AROM without pain or UE paresthesias

## 2025-01-11 NOTE — ED CDU PROVIDER DISPOSITION NOTE - CLINICAL COURSE
89-year-old R hand dominant female with a history of bladder cancer not pursuing treatment, paroxysmal atrial fibrillation on Eliquis, HTN, HLD, osteoporosis presenting after an unwitnessed fall this afternoon.  As per aide patient was sitting at the edge of the bed and she left the room briefly to grab something and heard a thud, went back into the room and saw the patient on the ground.  Patient was unresponsive for <5 seconds and then she called 911. No nausea/vomiting, headache. Patient does not recall the fall.  Patient was in front of the wall by her bed likely fell forward per aid. patient has not had any recent illness, other falls/injuries, shortness of breath, chest pain, dysuria, fever, night sweats, chills.  In ED, labs nonactionable, UA dirty catch and patient without urinary symptoms, no evidence of ICH on CT head, left distal radius fracture reduced by orthopedics, ortho recommended Keflex but both patient and son at bedside declining antibiotics at this time and are aware of risk/complication of infection.  CDU for pain control, PT/case management for specialized walker.  Patient has full-time aide at home.  In CDU, 89-year-old R hand dominant female with a history of bladder cancer not pursuing treatment, paroxysmal atrial fibrillation on Eliquis, HTN, HLD, osteoporosis presenting after an unwitnessed fall this afternoon.  As per aide patient was sitting at the edge of the bed and she left the room briefly to grab something and heard a thud, went back into the room and saw the patient on the ground.  Patient was unresponsive for <5 seconds and then she called 911. No nausea/vomiting, headache. Patient does not recall the fall.  Patient was in front of the wall by her bed likely fell forward per aid. patient has not had any recent illness, other falls/injuries, shortness of breath, chest pain, dysuria, fever, night sweats, chills.  In ED, labs nonactionable, UA dirty catch and patient without urinary symptoms, no evidence of ICH on CT head, left distal radius fracture reduced by orthopedics, ortho recommended Keflex but both patient and son at bedside declining antibiotics at this time and are aware of risk/complication of infection.  CDU for pain control, PT/case management for specialized walker.  Patient has full-time aide at home.  In CDU, patient HD stable, she was seen and evaluated by PT and ortho who recommended home pt and cane vs roling walker. PT indicates patient will require the platform walker as she is unable to use cane. family at bedside, case management assisting. to be discharged. discussed with Dr. Kuo -

## 2025-01-11 NOTE — ED PROVIDER NOTE - WR ORDER STATUS 4
Resulted
Attending Max: I performed a history and physical exam of the patient and discussed their management with the resident/fellow/ACP/student. I have reviewed the resident/fellow/ACP/student note and agree with the documented findings and plan of care, except as noted. My medical decision making and observations are found above. Please refer to any progress notes for updates on clinical course.

## 2025-01-11 NOTE — ED CDU PROVIDER DISPOSITION NOTE - PATIENT PORTAL LINK FT
You can access the FollowMyHealth Patient Portal offered by [facility] by registering at the following website: [email or web address]. By joining [facility]’s FollowMyHealth portal, you will also be able to view your health information using other applications (apps) compatible with our system.

## 2025-01-11 NOTE — ED ADULT NURSE NOTE - OBJECTIVE STATEMENT
89 yr old female to ED acoomp by Home attendant and EMS with neck collar on Pt mechanical fall, walking with walker, witnessed by aide. + LOC On Eliquis H/O CVA Pt awake alert and orientedx4 Son also at bedside Resp even and nonlab GCS 15. Denies numbness or weakness to extremities No facial droop or slurred speech Eccymosis noted L eye. · HPI Objective Statement: 89-year-old female with a history of bladder cancer not pursuing treatment, paroxysmal atrial fibrillation on Eliquis, HTN, HLD, osteoporosis presenting after an unwitnessed fall this afternoon.  Aide at bedside patient.  She states patient was sitting at the edge of the bed and she left the room briefly to grab something and heard a thud, went back into the room and saw the patient on the ground.  Patient was unresponsive for <5 seconds and then she called 911. No nausea/vomiting, headache. Patient does not recall the fall.  Patient was in front of the wall by her bed likely fell forward per aid. patient has not had any recent illness, other falls/injuries, shortness of breath, chest pain, dysuria, fever, night sweats, chills    89 yr old female to ED acoomp by Home attendant and EMS with neck collar on Pt mechanical fall, walking with walker, witnessed by aide. + LOC On Eliquis H/O CVA Pt awake alert and orientedx4 Son also at bedside Resp even and nonlab GCS 15. Denies numbness or weakness to extremities No facial droop or slurred speech Eccymosis noted L eye. 89 yr old female to ED acomp by Home attendant and EMS with neck collar on Pt was sitting at the edge fall , Per home attendant left for a sec pt was on the floor Unresponsive <5 sec Called 911 . + LOC On Eliquis H/O CVA Pt awake alert and orientedx3 Pt doesn't remember fall. Responds approp to verbal and tactile stimuli  Son also at bedside.  Resp even and nonlab GCS 15. Denies numbness or weakness to extremities No facial droop or slurred speech Ecchymosis noted L eye.

## 2025-01-11 NOTE — ED PROVIDER NOTE - ATTENDING CONTRIBUTION TO CARE
89-year-old female with a history of bladder cancer not pursuing treatment, paroxysmal atrial fibrillation on Eliquis, HTN, HLD, osteoporosis, cognitive impairment presenting after an unwitnessed fall this afternoon with brief LOC, now c/o left wrist pain and pain on chin due to cervical collar.  Patient is hemodynamically stable,vitals non actionable. Physical exam was notable for ecchymosis to the left orbit with tenderness to palpation w/o e/o entrapment, tenderness to palpation of left forearm and wrist with left wrist ecchymosis, deformity and hematoma.  Patient with history of multiple mechanical falls in the past.  Will obtain basic labs, UA due to history of previous UTIs, x-rays of left upper extremity, and CT head and nec 89-year-old female with a history of bladder cancer not pursuing treatment, paroxysmal atrial fibrillation on Eliquis, HTN, HLD, osteoporosis, cognitive impairment presenting after an unwitnessed fall this afternoon with brief LOC, now c/o left wrist pain and pain on chin due to cervical collar which was placed by EMS.  Pt accompanied by her 24/7 aide and son. Patient is hemodynamically stable, vitals non actionable. Physical exam was notable for ecchymosis to the left orbit with tenderness to palpation w/o e/o entrapment, tenderness to palpation of left forearm and wrist with left wrist ecchymosis, deformity, and small hematoma to underside of wrist.  No midline CTL spine TTP, step off or deformity. No posterior scalp hematoma or contusions. NO TTP chest apbd or pelvis, FROM in BL LE, RUE, Dec ROM at L wrist 2/2 pain. Palp DPs and good cap refill in ext x4.  Patient with history of multiple mechanical falls in the past and this is likely due to same. Will obtain basic labs, UA due to history of previous UTIs, x-rays of left upper extremity to r/o fracture, and CT head and neck given head trauma on AC. Dispo TBD based upon results of labs, imaging, re-eval.

## 2025-01-11 NOTE — ED CDU PROVIDER DISPOSITION NOTE - ATTENDING APP SHARED VISIT CONTRIBUTION OF CARE
CLIFFORD: I , Dr Kristie Kuo have seen and evaluated the patient. Results of labs, tests, imaging have been reviewed. The patient was seen by PT and case mgmt. Has 24/7 aide at home. Son at bedside and informed of plan. The patient is stable for discharge home and will follow up with their primary physician.

## 2025-01-11 NOTE — ED CDU PROVIDER INITIAL DAY NOTE - ATTENDING APP SHARED VISIT CONTRIBUTION OF CARE
Attending MD Duffy: I personally made/approved the management plan and take responsibility for the patient management.          *The above represents an initial assessment/impression. Please refer to progress notes for potential changes in patient clinical course*

## 2025-01-12 VITALS
OXYGEN SATURATION: 100 % | HEART RATE: 69 BPM | RESPIRATION RATE: 17 BRPM | DIASTOLIC BLOOD PRESSURE: 69 MMHG | TEMPERATURE: 98 F | SYSTOLIC BLOOD PRESSURE: 156 MMHG

## 2025-01-12 LAB
APPEARANCE UR: CLEAR — SIGNIFICANT CHANGE UP
BACTERIA # UR AUTO: ABNORMAL /HPF
BILIRUB UR-MCNC: NEGATIVE — SIGNIFICANT CHANGE UP
CAST: 0 /LPF — SIGNIFICANT CHANGE UP (ref 0–4)
COLOR SPEC: YELLOW — SIGNIFICANT CHANGE UP
DIFF PNL FLD: NEGATIVE — SIGNIFICANT CHANGE UP
GLUCOSE UR QL: NEGATIVE MG/DL — SIGNIFICANT CHANGE UP
KETONES UR-MCNC: ABNORMAL MG/DL
LEUKOCYTE ESTERASE UR-ACNC: ABNORMAL
NITRITE UR-MCNC: NEGATIVE — SIGNIFICANT CHANGE UP
PH UR: 7 — SIGNIFICANT CHANGE UP (ref 5–8)
PROT UR-MCNC: SIGNIFICANT CHANGE UP MG/DL
RBC CASTS # UR COMP ASSIST: 3 /HPF — SIGNIFICANT CHANGE UP (ref 0–4)
SP GR SPEC: 1.02 — SIGNIFICANT CHANGE UP (ref 1–1.03)
SQUAMOUS # UR AUTO: 3 /HPF — SIGNIFICANT CHANGE UP (ref 0–5)
UROBILINOGEN FLD QL: 0.2 MG/DL — SIGNIFICANT CHANGE UP (ref 0.2–1)
WBC UR QL: 27 /HPF — HIGH (ref 0–5)

## 2025-01-12 PROCEDURE — 99238 HOSP IP/OBS DSCHRG MGMT 30/<: CPT

## 2025-01-12 PROCEDURE — 25505 CLTX RDL SHFT FX W/MNPJ: CPT

## 2025-01-12 PROCEDURE — 99285 EMERGENCY DEPT VISIT HI MDM: CPT | Mod: 25

## 2025-01-12 PROCEDURE — 93005 ELECTROCARDIOGRAM TRACING: CPT | Mod: XU

## 2025-01-12 PROCEDURE — 73110 X-RAY EXAM OF WRIST: CPT

## 2025-01-12 PROCEDURE — 72125 CT NECK SPINE W/O DYE: CPT | Mod: MC

## 2025-01-12 PROCEDURE — 85025 COMPLETE CBC W/AUTO DIFF WBC: CPT

## 2025-01-12 PROCEDURE — 73070 X-RAY EXAM OF ELBOW: CPT

## 2025-01-12 PROCEDURE — 87077 CULTURE AEROBIC IDENTIFY: CPT

## 2025-01-12 PROCEDURE — 97161 PT EVAL LOW COMPLEX 20 MIN: CPT

## 2025-01-12 PROCEDURE — 73090 X-RAY EXAM OF FOREARM: CPT

## 2025-01-12 PROCEDURE — 81001 URINALYSIS AUTO W/SCOPE: CPT

## 2025-01-12 PROCEDURE — 80053 COMPREHEN METABOLIC PANEL: CPT

## 2025-01-12 PROCEDURE — G0378: CPT

## 2025-01-12 PROCEDURE — 96374 THER/PROPH/DIAG INJ IV PUSH: CPT | Mod: XU

## 2025-01-12 PROCEDURE — 73020 X-RAY EXAM OF SHOULDER: CPT

## 2025-01-12 PROCEDURE — 73030 X-RAY EXAM OF SHOULDER: CPT

## 2025-01-12 PROCEDURE — 83735 ASSAY OF MAGNESIUM: CPT

## 2025-01-12 PROCEDURE — 70450 CT HEAD/BRAIN W/O DYE: CPT | Mod: MC

## 2025-01-12 PROCEDURE — 73060 X-RAY EXAM OF HUMERUS: CPT

## 2025-01-12 PROCEDURE — 73100 X-RAY EXAM OF WRIST: CPT

## 2025-01-12 RX ADMIN — Medication 2.5 MILLIGRAM(S): at 06:32

## 2025-01-12 RX ADMIN — Medication 25 MILLIGRAM(S): at 06:30

## 2025-01-12 RX ADMIN — APIXABAN 2.5 MILLIGRAM(S): 5 TABLET, FILM COATED ORAL at 06:30

## 2025-01-12 NOTE — ED CDU PROVIDER SUBSEQUENT DAY NOTE - PROGRESS NOTE DETAILS
Spoke with orthopedics to clarify which assistive device would work they indicate they prefer leg cane however if needed a platform walker could be used.  Physical therapy at bedside evaluating patient at this time awaiting their recommendations and then will contact case management to assist in ensuring the patient gets the appropriate device.  Patient does have 24-hour care at home and family indicates they are anxious to get her home. - Ya Bray PA-C Physical therapy completed their evaluation and is recommending rolling platform walker.  Home PT.  Scripts were printed and given to the  Y[facility] Muro.  Patient's family members at bedside indicates they are declining Keflex at this time discussed all of the precautions such as erythema to the fingers or warmth, fevers that would warrant prompt evaluation.  Patient to follow-up outpatient with Dr. Ruth.  Patient and family are comfortable with the plan.  Patient's son is at bedside states he will drive her home and she will have some additional assistance at home while awaiting the medical supplies.

## 2025-01-12 NOTE — PHYSICAL THERAPY INITIAL EVALUATION ADULT - LEVEL OF INDEPENDENCE: GAIT, REHAB EVAL
25ft min-modA with hand-held assist and 25ft with large-based quad cane; additional 50ft with platform rolling walker CGA 25ft min-modA with hand-held assist and 25ft min-modA with large-based quad cane; additional 50ft with platform rolling walker CGA

## 2025-01-12 NOTE — PHYSICAL THERAPY INITIAL EVALUATION ADULT - PERTINENT HX OF CURRENT PROBLEM, REHAB EVAL
89y Female RHD community ambulator with walker with a history of bladder cancer not pursuing treatment, paroxysmal atrial fibrillation on Eliquis, HTN, HLD, osteoporosis presenting after an unwitnessed fall this afternoon.  Aide at bedside patient.  She states patient was sitting at the edge of the bed and she left the room briefly to grab something and heard a thud, went back into the room and saw the patient on the ground.  Patient was unresponsive for <5 seconds and then she called 911. No nausea/vomiting, headache. Patient does not recall the fall.  Patient was in front of the wall by her bed likely fell forward per aid. patient has not had any recent illness, other falls/injuries, numbness/tingling, shortness of breath, chest pain, dysuria, fever, night sweats, chills.    CT Head/C/S: No CT evidence of acute traumatic injury to the head and cervical spine. Chronic small vessel ischemic white matter change. Air-fluid level within the left maxillary sinus, partially included. Correlate clinically for sinusitis. If there is concern for facial bone trauma, dedicated facial bone CT may be obtained. 1.3 cm right thyroid nodule which is nonspecific but can be better characterized with ultrasound if clinically indicated. MRI may be obtained, if further information regarding ligamentous injury,   hematoma or spinal cord pathology is required. L Shoulder xray: No acute displaced fracture or dislocation. Mild acromioclavicular arthrosis. Atherosclerotic calcifications are noted. L elbow/forearm/wrist xray: Acute dorsally displaced metaphyseal extra-articular fracture of the left distal radius.    NWB LUE in splint, keep c/d/I,   Sling while ambulating, otherwise for comfort 89y Female RHD community ambulator with walker with a history of bladder cancer not pursuing treatment, paroxysmal atrial fibrillation on Eliquis, HTN, HLD, osteoporosis presenting after an unwitnessed fall this afternoon.  Aide at bedside patient.  She states patient was sitting at the edge of the bed and she left the room briefly to grab something and heard a thud, went back into the room and saw the patient on the ground.  Patient was unresponsive for <5 seconds and then she called 911. No nausea/vomiting, headache. Patient does not recall the fall.  Patient was in front of the wall by her bed likely fell forward per aid. patient has not had any recent illness, other falls/injuries, numbness/tingling, shortness of breath, chest pain, dysuria, fever, night sweats, chills.    CT Head/C/S: No CT evidence of acute traumatic injury to the head and cervical spine. Chronic small vessel ischemic white matter change. Air-fluid level within the left maxillary sinus, partially included. Correlate clinically for sinusitis. If there is concern for facial bone trauma, dedicated facial bone CT may be obtained. 1.3 cm right thyroid nodule which is nonspecific but can be better characterized with ultrasound if clinically indicated. MRI may be obtained, if further information regarding ligamentous injury, hematoma or spinal cord pathology is required. L Shoulder xray: No acute displaced fracture or dislocation. Mild acromioclavicular arthrosis. Atherosclerotic calcifications are noted. L elbow/forearm/wrist xray: Acute dorsally displaced metaphyseal extra-articular fracture of the left distal radius.    NWB LUE in splint, keep c/d/I,   Sling while ambulating, otherwise for comfort

## 2025-01-12 NOTE — ED ADULT NURSE REASSESSMENT NOTE - NS ED NURSE REASSESS COMMENT FT1
On re-assessment pt currently appears comfortable resting in stretcher in room with appropriate side rails up for safety. pt is awake and alert, vital signs stable, breathing even and unlabored, NAD noted at this time.

## 2025-01-12 NOTE — PHYSICAL THERAPY INITIAL EVALUATION ADULT - ASR WT BEARING STATUS EVAL
Per ortho NWB LUE in splint, sling while ambulating, otherwise for comfort. Per ED PA/ortho team, OK for platform walker if needed./Left UE

## 2025-01-12 NOTE — PHYSICAL THERAPY INITIAL EVALUATION ADULT - ADDITIONAL COMMENTS
Pt lives with 24/7 aide in a private house with 2 steps to enter. Prior to admission, pt was independent with ambulation with walker, aide assist with ADLs. Owns rolling walker, 3 wheeled/tripod walker, rollator, w/c, commode.

## 2025-01-12 NOTE — PHYSICAL THERAPY INITIAL EVALUATION ADULT - ACTIVE RANGE OF MOTION EXAMINATION, REHAB EVAL
L elbow/wrist not tested 2/2 sling/bilateral upper extremity Active ROM was WFL (within functional limits)/bilateral  lower extremity Active ROM was WFL (within functional limits)

## 2025-01-12 NOTE — ED ADULT NURSE REASSESSMENT NOTE - NS ED NURSE REASSESS COMMENT FT1
Pt received from YOLANDA Cruz. Pt oriented to CDU & plan of care was discussed. Pt A&O x2 pt does not know year or month. Pt in CDU for pain control, PT. Pt denies any chills, fever, chest pain, SOB, dizziness or palpitations. Left upper extremity splinted distally and in sling, no finger edema and good cap refill. V/S stable, pt afebrile,  IV in place, patent and free of signs of infiltration. Pt resting in bed. Safety & comfort measures maintained. Call bell in reach. Will continue to monitor.

## 2025-01-12 NOTE — ED CDU PROVIDER SUBSEQUENT DAY NOTE - HISTORY
Patient reassessed, resting comfortably without any new or evolving complaints.  Plan for PT eval and case management today.

## 2025-01-12 NOTE — PHYSICAL THERAPY INITIAL EVALUATION ADULT - NSPTDMEREC_GEN_A_CORE
platform rolling walker/platform walker platform rolling walker; Pt will require a platform rolling walker at home due to their dx of impaired balance and decreased strength to help complete mobility related activity for daily living (MRADLs)./platform walker

## 2025-01-12 NOTE — PHYSICAL THERAPY INITIAL EVALUATION ADULT - GENERAL OBSERVATIONS, REHAB EVAL
Rec'd semi-supine in bed in ED in NAD, +IVL, +LUE splint, +LUE sling, A&O x2-3 with cues (self, hospital, month/year) son at bedside. Rec'd semi-supine in bed in ED in NAD, VSS, +IVL, +LUE splint, +LUE sling, A&O x2-3 with cues (self, hospital, month/year) son at bedside.

## 2025-01-13 LAB
CULTURE RESULTS: ABNORMAL
CULTURE RESULTS: SIGNIFICANT CHANGE UP
SPECIMEN SOURCE: SIGNIFICANT CHANGE UP
SPECIMEN SOURCE: SIGNIFICANT CHANGE UP

## 2025-01-17 PROBLEM — S62.102A CLOSED FRACTURE OF LEFT WRIST, INITIAL ENCOUNTER: Status: ACTIVE | Noted: 2025-01-17

## 2025-01-21 ENCOUNTER — APPOINTMENT (OUTPATIENT)
Dept: ORTHOPEDIC SURGERY | Facility: CLINIC | Age: 89
End: 2025-01-21

## 2025-01-21 DIAGNOSIS — S62.102A FRACTURE OF UNSPECIFIED CARPAL BONE, LEFT WRIST, INITIAL ENCOUNTER FOR CLOSED FRACTURE: ICD-10-CM

## 2025-01-21 PROCEDURE — 73110 X-RAY EXAM OF WRIST: CPT | Mod: LT

## 2025-01-21 PROCEDURE — 99214 OFFICE O/P EST MOD 30 MIN: CPT | Mod: 25

## 2025-01-21 PROCEDURE — 29075 APPL CST ELBW FNGR SHORT ARM: CPT | Mod: LT

## 2025-02-11 ENCOUNTER — APPOINTMENT (OUTPATIENT)
Dept: ORTHOPEDIC SURGERY | Facility: CLINIC | Age: 89
End: 2025-02-11
Payer: MEDICARE

## 2025-02-11 VITALS — BODY MASS INDEX: 22.15 KG/M2 | WEIGHT: 125 LBS | HEIGHT: 63 IN

## 2025-02-11 DIAGNOSIS — S62.102A FRACTURE OF UNSPECIFIED CARPAL BONE, LEFT WRIST, INITIAL ENCOUNTER FOR CLOSED FRACTURE: ICD-10-CM

## 2025-02-11 PROCEDURE — 99213 OFFICE O/P EST LOW 20 MIN: CPT

## 2025-02-11 PROCEDURE — 73110 X-RAY EXAM OF WRIST: CPT | Mod: LT

## 2025-03-03 ENCOUNTER — APPOINTMENT (OUTPATIENT)
Dept: ORTHOPEDIC SURGERY | Facility: CLINIC | Age: 89
End: 2025-03-03
Payer: MEDICARE

## 2025-03-03 DIAGNOSIS — S62.102A FRACTURE OF UNSPECIFIED CARPAL BONE, LEFT WRIST, INITIAL ENCOUNTER FOR CLOSED FRACTURE: ICD-10-CM

## 2025-03-03 PROCEDURE — 73110 X-RAY EXAM OF WRIST: CPT | Mod: LT

## 2025-03-03 PROCEDURE — 99213 OFFICE O/P EST LOW 20 MIN: CPT

## 2025-04-07 ENCOUNTER — NON-APPOINTMENT (OUTPATIENT)
Age: 89
End: 2025-04-07

## 2025-04-07 ENCOUNTER — APPOINTMENT (OUTPATIENT)
Dept: ORTHOPEDIC SURGERY | Facility: CLINIC | Age: 89
End: 2025-04-07
Payer: MEDICARE

## 2025-04-07 DIAGNOSIS — S62.102A FRACTURE OF UNSPECIFIED CARPAL BONE, LEFT WRIST, INITIAL ENCOUNTER FOR CLOSED FRACTURE: ICD-10-CM

## 2025-04-07 PROCEDURE — 73110 X-RAY EXAM OF WRIST: CPT | Mod: LT

## 2025-04-07 PROCEDURE — 99213 OFFICE O/P EST LOW 20 MIN: CPT
